# Patient Record
Sex: FEMALE | Race: WHITE | NOT HISPANIC OR LATINO | URBAN - METROPOLITAN AREA
[De-identification: names, ages, dates, MRNs, and addresses within clinical notes are randomized per-mention and may not be internally consistent; named-entity substitution may affect disease eponyms.]

---

## 2021-02-21 PROBLEM — K44.9 HH (HIATUS HERNIA): Status: ACTIVE | Noted: 2017-01-29

## 2021-02-21 PROBLEM — R41.0 DELIRIUM: Status: ACTIVE | Noted: 2021-01-11

## 2021-02-21 PROBLEM — D64.9 ANEMIA: Status: ACTIVE | Noted: 2019-10-29

## 2021-02-21 PROBLEM — U07.1 COVID-19 VIRUS INFECTION: Status: ACTIVE | Noted: 2020-12-18

## 2021-02-21 PROBLEM — I63.9 CEREBELLAR INFARCTION (HCC): Status: ACTIVE | Noted: 2020-12-07

## 2021-02-21 PROBLEM — K59.01 CONSTIPATION BY DELAYED COLONIC TRANSIT: Status: ACTIVE | Noted: 2017-01-29

## 2021-02-21 PROBLEM — F03.90 DEMENTIA WITHOUT BEHAVIORAL DISTURBANCE (HCC): Status: ACTIVE | Noted: 2021-01-11

## 2021-02-21 PROBLEM — C44.42 SQUAMOUS CELL CARCINOMA OF SCALP: Status: ACTIVE | Noted: 2018-10-29

## 2021-02-21 PROBLEM — C44.42 SQUAMOUS CELL CARCINOMA OF SCALP: Status: RESOLVED | Noted: 2018-10-29 | Resolved: 2021-02-21

## 2021-02-21 PROBLEM — M19.90 DJD (DEGENERATIVE JOINT DISEASE): Status: ACTIVE | Noted: 2017-01-29

## 2021-02-21 PROBLEM — N18.30 CKD (CHRONIC KIDNEY DISEASE) STAGE 3, GFR 30-59 ML/MIN (HCC): Status: ACTIVE | Noted: 2018-11-04

## 2021-02-21 PROBLEM — R82.71 ASYMPTOMATIC BACTERIURIA: Status: RESOLVED | Noted: 2020-12-18 | Resolved: 2021-02-21

## 2021-02-21 PROBLEM — I51.89 DIASTOLIC DYSFUNCTION: Status: ACTIVE | Noted: 2019-08-12
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
## 2021-02-21 PROBLEM — U07.1 COVID-19 VIRUS INFECTION: Status: RESOLVED | Noted: 2020-12-18 | Resolved: 2021-02-21

## 2021-02-21 PROBLEM — I63.81 LEFT SIDED LACUNAR INFARCTION (HCC): Status: ACTIVE | Noted: 2017-01-29

## 2021-02-21 PROBLEM — R13.10 DYSPHAGIA: Status: ACTIVE | Noted: 2019-08-12

## 2021-02-21 PROBLEM — R82.71 ASYMPTOMATIC BACTERIURIA: Status: ACTIVE | Noted: 2020-12-18

## 2021-02-21 RX ORDER — CITALOPRAM 10 MG/1
10 TABLET ORAL DAILY
COMMUNITY

## 2021-02-21 RX ORDER — ACETAMINOPHEN 325 MG/1
650 TABLET ORAL EVERY 4 HOURS PRN
COMMUNITY

## 2021-02-21 RX ORDER — OLANZAPINE 5 MG/1
2.5 TABLET, ORALLY DISINTEGRATING ORAL
COMMUNITY
Start: 2021-01-08 | End: 2021-07-30

## 2021-02-21 RX ORDER — BISACODYL 10 MG
10 SUPPOSITORY, RECTAL RECTAL
COMMUNITY

## 2021-02-21 RX ORDER — SIMVASTATIN 40 MG
40 TABLET ORAL
COMMUNITY
End: 2021-12-13

## 2021-02-21 RX ORDER — MAGNESIUM HYDROXIDE 2400 MG/30ML
30 SUSPENSION ORAL DAILY PRN
COMMUNITY

## 2021-02-21 RX ORDER — AMOXICILLIN 250 MG
1 CAPSULE ORAL 2 TIMES DAILY PRN
COMMUNITY
Start: 2021-01-08

## 2021-02-21 RX ORDER — LANOLIN ALCOHOL/MO/W.PET/CERES
3 CREAM (GRAM) TOPICAL
COMMUNITY

## 2021-02-22 ENCOUNTER — NURSING HOME VISIT (OUTPATIENT)
Dept: GERIATRICS | Facility: OTHER | Age: 86
End: 2021-02-22
Payer: COMMERCIAL

## 2021-02-22 DIAGNOSIS — R13.12 OROPHARYNGEAL DYSPHAGIA: Primary | ICD-10-CM

## 2021-02-22 DIAGNOSIS — N18.31 STAGE 3A CHRONIC KIDNEY DISEASE (HCC): ICD-10-CM

## 2021-02-22 DIAGNOSIS — F01.50 VASCULAR DEMENTIA WITHOUT BEHAVIORAL DISTURBANCE (HCC): ICD-10-CM

## 2021-02-22 DIAGNOSIS — E53.8 VITAMIN B 12 DEFICIENCY: ICD-10-CM

## 2021-02-22 DIAGNOSIS — E78.2 MIXED HYPERLIPIDEMIA: ICD-10-CM

## 2021-02-22 DIAGNOSIS — R26.2 AMBULATORY DYSFUNCTION: ICD-10-CM

## 2021-02-22 DIAGNOSIS — I10 ESSENTIAL HYPERTENSION: ICD-10-CM

## 2021-02-22 PROCEDURE — 99305 1ST NF CARE MODERATE MDM 35: CPT | Performed by: FAMILY MEDICINE

## 2021-02-22 NOTE — PROGRESS NOTES
[address]  [address] [place] [address]  [facility]    NAME: Ruth Coleman  AGE: 80 y o  SEX: female    DATE OF ENCOUNTER: 2/22/2021    Code status:  No CPR    Assessment and Plan     Dysphagia  On pureed diet, will continue with monitoring  Wt stable  Dementia without behavioral disturbance ([address] )  Needs NH care, wt stable  Will continue with monitoring  CKD (chronic kidney disease) stage 3, GFR 30-59 ml/min  Last lab done on 1/18/2021  will continue with monitoring  Hyperlipidemia  On statin, will continue with monitoring  Vitamin B 12 deficiency  On B12, will continue with monitoring  Essential hypertension  Off Valsartan since last hospitalization, stable  Last BMP done in January  Ambulatory dysfunction  With generalized weakness, PT on board  Will continue with monitoring  All medications and routine orders were reviewed and updated as needed  Plan discussed with: Nurse    Chief Complaint     Pt has no specific complaint  History of Present Illness   [facility] as this note who was admitted to [facility] on 12/15 with ambulatory dysfunction, and generalized weakness, and malaise and was covid-19 positive, and pt was not a candidate for either Decadron or Remdesivir  Pt also had E coli bacteruria w/o symptoms likely 2nd to colonization  Pt's hospital course was complicated with acute metabolic encephalopathy which was improved  Pt was also delusional at the hospital  Pt also had mild hypotension and her Valsartan was discontinued  Pt also had JOÃO/CKD  Pt was admitted to [facility] on 1/10/2021 under LV provider's care and she is now in C2 unit under my care  Pt's wt has been stable (last wt in January)  Tolerates pureed diet  Last lab done in January       HISTORY:  Past Medical History:   Diagnosis Date    Asymptomatic bacteriuria 12/18/2020    COVID-19 virus infection 12/18/2020    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/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    Formatting of this note might be different from the original  Added automatically from request for surgery [id number]     Family History   Problem Relation Age of Onset    Coronary artery disease Mother     Cancer Father      Social History     Socioeconomic History    Marital status: None     Spouse name: None    Number of children: None    Years of education: None    Highest education level: None   Occupational History    None   Social Needs    Financial resource strain: None    Food insecurity     Worry: None     Inability: None    Transportation needs     Medical: None     Non-medical: None   Tobacco Use    Smoking status: Never Smoker    Smokeless tobacco: Never Used   Substance and Sexual Activity    Alcohol use: Never     Frequency: Never    Drug use: Never    Sexual activity: Not Currently   Lifestyle    Physical activity     Days per week: None     Minutes per session: None    Stress: None   Relationships    Social connections     Talks on phone: None     Gets together: None     Attends Congregation service: None     Active member of club or organization: None     Attends meetings of clubs or organizations: None     Relationship status: None    Intimate partner violence     Fear of current or ex partner: None     Emotionally abused: None     Physically abused: None     Forced sexual activity: None   Other Topics Concern    None   Social History Narrative    None       Allergies: Allergies   Allergen Reactions    Lisinopril Hives     Other reaction(s): LISINOPRIL (ZESTRIL) (hives)       Review of Systems     Review of Systems   Unable to perform ROS: Dementia (but pt overall has no complaint )       Medications and orders     All medications reviewed and updated in Nursing Home EMR  Objective     Vitals: wt in January:128 1Ibs   Afebrile  BP:130/58   RR:18    Physical Exam  Vitals signs and nursing note reviewed     Constitutional:       General: She is not in acute distress  Appearance: Normal appearance  She is well-developed  She is not ill-appearing, toxic-appearing or diaphoretic  HENT:      Head: Normocephalic and atraumatic  Right Ear: External ear normal       Left Ear: External ear normal       Ears:      Comments: [place]     Nose: Nose normal       Mouth/Throat:      Mouth: Mucous membranes are moist    Eyes:      General: No scleral icterus  Right eye: No discharge  Left eye: No discharge  Conjunctiva/sclera: Conjunctivae normal       Pupils: Pupils are equal, round, and reactive to light  Neck:      Musculoskeletal: Normal range of motion and neck supple  No neck rigidity or muscular tenderness  Cardiovascular:      Rate and Rhythm: Normal rate and regular rhythm  Heart sounds: Normal heart sounds  No murmur  No friction rub  No gallop  Pulmonary:      Effort: Pulmonary effort is normal  No respiratory distress  Breath sounds: Normal breath sounds  No stridor  No wheezing, rhonchi or rales  Chest:      Chest wall: No tenderness  Abdominal:      General: Abdomen is flat  Bowel sounds are normal  There is no distension  Palpations: Abdomen is soft  There is no mass  Tenderness: There is no abdominal tenderness  There is no guarding or rebound  Hernia: No hernia is present  Genitourinary:     Comments: Deferred  Musculoskeletal:         General: No swelling, tenderness, deformity or signs of injury  Right lower leg: No edema  Left lower leg: No edema  Comments: Pt is in bed, lying  Limited ROM of UEs and LEs  Lymphadenopathy:      Cervical: No cervical adenopathy  Skin:     General: Skin is warm and dry  Coloration: Skin is not jaundiced or pale  Findings: No bruising, erythema, lesion or rash  Comments: Didn't examine sacral area  Neurological:      Mental Status: She is alert  Cranial Nerves: Cranial nerve deficit ([place]) present        Comments: Limited due to pt's dementia    Psychiatric:         Behavior: Behavior normal          Pertinent Laboratory/Diagnostic Studies: The following labs/studies were reviewed please see chart or hospital paperwork for details    CBC WITH DIFF (01/18/2021 5:10 AM EST)  CBC WITH DIFF (01/18/2021 5:10 AM EST)   Component Value Ref Range Performed At Pathologist Signature   Hemoglobin 9 5 (L) 11 5 - 14 5 g/dL [facility]     Hematocrit 28 5 (L) 35 0 - 43 0 % [facility]     WBC 6 2 4 0 - 10 0 thou/cmm [facility]     RBC 3 14 (L) 3 70 - 4 70 mill/cmm [facility]     Platelet Count 987 920 - 350 thou/cmm [facility]     MPV 9 6 7 5 - 11 3 fL [facility]     MCV 91 80 - 100 fL [facility]     MCH 30 1 26 0 - 34 0 pg [facility]     MCHC 33 2 32 0 - 37 0 g/dL [facility]     RDW 15 8 12 0 - 16 0 % [facility]     Differential Type AUTO   [facility]     Absolute Neutrophils 3 3 1 8 - 7 8 thou/cmm [facility]     Absolute Lymphocytes 2 1 1 0 - 3 0 thou/cmm [facility]     Absolute Monocytes 0 5 0 3 - 1 0 thou/cmm [facility]     Absolute Eosinophils 0 2 0 0 - 0 5 thou/cmm [facility]     Absolute Basophils 0 0 0 0 - 0 1 thou/cmm [facility]     Neutrophils 53 % [facility]     Lymphocytes 34 % [facility]     Monocytes 9 % [facility]     Eosinophils 3 % [facility]     Basophils 1 % [facility]       BASIC METABOLIC PANEL (50/85/0857 5:10 AM EST)  BASIC METABOLIC PANEL (21/64/6952 5:10 AM EST)   Component Value Ref Range Performed At Pathologist Signature   Glucose 76 65 - 99 mg/dL [facility]     BUN 26 (H) 7 - 25 mg/dL [facility]     Creatinine 0 95 0 40 - 1 10 mg/dL [facility]     Sodium 141 135 - 145 mmol/L [facility]     Potassium 4 2 3 5 - 5 2 mmol/L [facility]     Chloride 108 100 - 109 mmol/L [facility]     Carbon Dioxide 27 23 - 31 mmol/L [facility]     Calcium 8 9 8 5 - 10 1 mg/dL [facility]     Anion Gap 6 3 - 11 [facility]     eGFR, Non-African American 52 (L) >60 [facility]     eGFR,  60 (L) >60 [facility]     eGFR Comment Units: mL/min per 1 73 square meters  Comment:                                            Normal Function or Mild Renal   Disease (if clinically at risk):  >or=60  Moderately Decreased:                30-59  Severely Decreased:                  15-29  Renal Failure:                         <15                                           Please note that the eGFR is based on the CKD-EPI calculation, and is not intended to be used for drug dosing                                            Note: Calculated GFR may not be an accurate indicator of renal function if the patient's renal function is not in a steady state            COVID-19 CORONAVIRUS PCRResulted: 12/29/2020 4:02 PM  [facility]  Component Name Value Ref Range   SARS Coronavirus 2 PCR Detected (A)   Comment:                                            Results confidentially reported to health authorities  [facility] requires mandatory reporting of any positive result for this assay                                             Method: Reverse Transcription Polymerase Chain Reaction                                            This test cannot rule out infections caused by other viral or bacterial pathogens  A Not Detected result does not rule out the presence of COVID-19 below the level of detection for this assay or the presence of inhibitors  Results should be interpreted in conjunction with clinical and epidemiological information  Re-testing should be considered in consultation with public health authorities                                              As of March 13 2020, CDC recommends collecting upper respiratory nasopharyngeal swab (NP) for initial diagnostic testing  Specimens should be collected as soon as possible once a PUI is identified, regardless of the time of symptom onset                                             The reagents for this assay have been granted Emergency Use Authorization (EUA) by the U S  Food and Drug Administration  The performance of this assay has been verified by this laboratory, qualified under the Federal Clinical Laboratory Improvement Amendments (CLIA) to perform high complexity clinical laboratory testing                                             In order to maximize COVID19 testing capacity, [facility] is utilizing multiple platforms with comparable performance for SARS CoV-2 detection  Testing will be distributed amongst these assays depending on turnaround time and reagent availability   Not Detected              Scotty Walker MD  2/22/2021 4:48 PM

## 2021-03-10 ENCOUNTER — NURSING HOME VISIT (OUTPATIENT)
Dept: GERIATRICS | Facility: OTHER | Age: 86
End: 2021-03-10
Payer: COMMERCIAL

## 2021-03-10 DIAGNOSIS — R22.31 AXILLARY LUMP, RIGHT: Primary | ICD-10-CM

## 2021-03-10 PROCEDURE — 99308 SBSQ NF CARE LOW MDM 20: CPT | Performed by: FAMILY MEDICINE

## 2021-03-10 NOTE — PROGRESS NOTES
[address] [facility]  [address]  [phone]  Facility: [place] [address]          NAME: Ashish Tomlinson  AGE: 80 y o  SEX: female    DATE OF ENCOUNTER: 3/10/2021    Chief Complaint     Pt has no complaint     History of Present Illness     HPI    The following portions of the patient's history were reviewed and updated as appropriate (from facility chart and hospital records): allergies, current medications, past family history, past medical history, past social history, past surgical history and problem list  Pt was seen and examined per staff report and request regarding incidental finding of a lump under left axillary area  No other issues or concerns  Review of Systems     Review of Systems   Unable to perform ROS: Dementia   Skin:        Pt denies any pain        Active Problem List     Patient Active Problem List   Diagnosis    Anemia    Cerebellar infarction ([facility] )    CKD (chronic kidney disease) stage 3, GFR 30-59 ml/min    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    Delirium    Dementia without behavioral disturbance ([facility] )    Diastolic dysfunction    DJD (degenerative joint disease)    Dysphagia    Essential hypertension    Fall    Glucose intolerance (impaired glucose tolerance)    HH (hiatus hernia)    Hyperlipidemia    Left sided lacunar infarction ([facility] )    Vitamin B 12 deficiency    Ambulatory dysfunction    Axillary lump, right       Objective     Vitals: afebrile     Physical Exam  Skin:     Comments: Left anterior axillary line has movable, 1x1 cm lump  Not tender, not red  Not open  Pertinent Laboratory/Diagnostic Studies:  No lab for this visit     Current Medications   Medication list in facility chart was reviewed and no changes made  Assessment and Plan   Axillary lump, right  ??? Lipoma  Pt is on comfort care, will continue with watchful management       Mert Hall MD  5/92/07060:02 PM

## 2021-03-24 ENCOUNTER — NURSING HOME VISIT (OUTPATIENT)
Dept: GERIATRICS | Facility: OTHER | Age: 86
End: 2021-03-24
Payer: COMMERCIAL

## 2021-03-24 DIAGNOSIS — F22 DELUSIONAL DISORDER (HCC): ICD-10-CM

## 2021-03-24 DIAGNOSIS — R13.12 OROPHARYNGEAL DYSPHAGIA: ICD-10-CM

## 2021-03-24 DIAGNOSIS — E78.2 MIXED HYPERLIPIDEMIA: ICD-10-CM

## 2021-03-24 DIAGNOSIS — E53.8 VITAMIN B 12 DEFICIENCY: ICD-10-CM

## 2021-03-24 DIAGNOSIS — N18.31 STAGE 3A CHRONIC KIDNEY DISEASE (HCC): ICD-10-CM

## 2021-03-24 DIAGNOSIS — F01.50 VASCULAR DEMENTIA WITHOUT BEHAVIORAL DISTURBANCE (HCC): Primary | ICD-10-CM

## 2021-03-24 PROCEDURE — 99309 SBSQ NF CARE MODERATE MDM 30: CPT | Performed by: FAMILY MEDICINE

## 2021-03-24 NOTE — PROGRESS NOTES
[address] [facility]  [address]  [phone]  Facility: [place] [address]          NAME: Alyx Gallegos  AGE: 80 y o  SEX: female    DATE OF ENCOUNTER: 3/24/2021    Chief Complaint     Pt has no specific complaint     History of Present Illness     HPI    The following portions of the patient's history were reviewed and updated as appropriate (from facility chart and hospital records): allergies, current medications, past family history, past medical history, past social history, past surgical history and problem list  Pt was seen and examined for f/u on Dementia, Dysphagia, CKD, HLD,HTN,and ambulatory dysfunction  Pt has been stable  Wt is stable  BP& BS stable  No behaviors  Meal completion good, tolerates pureed diet  Overall stale  No specific complaint or issues  On statin  Review of Systems     Review of Systems   Constitutional: Negative  HENT: Negative  Eyes: Negative  Respiratory: Negative  Cardiovascular: Negative  Gastrointestinal: Negative  Endocrine: Negative  Genitourinary: Negative  Musculoskeletal: Negative  Skin: Negative  Allergic/Immunologic: Negative  Neurological: Negative  Hematological: Negative  Psychiatric/Behavioral: Negative  All other systems reviewed and are negative        Active Problem List     Patient Active Problem List   Diagnosis    Anemia    Cerebellar infarction ([address] )    CKD (chronic kidney disease) stage 3, GFR 30-59 ml/min    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    Delirium    Dementia without behavioral disturbance ([address] )    Diastolic dysfunction    DJD (degenerative joint disease)    Dysphagia    Essential hypertension    Fall    Glucose intolerance (impaired glucose tolerance)    HH (hiatus hernia)    Hyperlipidemia    Left sided lacunar infarction ([address] )    Vitamin B 12 deficiency    Ambulatory dysfunction    Axillary lump, right    Delusional disorder ([address] )       Objective Vitals: wt:137 1Ibs       BP:116/62       Afebrile     Physical Exam  Vitals signs and nursing note reviewed  Constitutional:       General: She is not in acute distress  Appearance: Normal appearance  She is well-developed  She is not ill-appearing, toxic-appearing or diaphoretic  HENT:      Head: Normocephalic and atraumatic  Right Ear: External ear normal       Left Ear: External ear normal       Ears:      Comments: [place]     Nose: Nose normal  No congestion or rhinorrhea  Mouth/Throat:      Mouth: Mucous membranes are moist    Eyes:      General: No scleral icterus  Right eye: No discharge  Left eye: No discharge  Conjunctiva/sclera: Conjunctivae normal       Pupils: Pupils are equal, round, and reactive to light  Neck:      Musculoskeletal: Normal range of motion and neck supple  No neck rigidity or muscular tenderness  Cardiovascular:      Rate and Rhythm: Normal rate and regular rhythm  Heart sounds: Normal heart sounds  No murmur  No friction rub  No gallop  Pulmonary:      Effort: Pulmonary effort is normal  No respiratory distress  Breath sounds: Normal breath sounds  No stridor  No wheezing, rhonchi or rales  Chest:      Chest wall: No tenderness  Abdominal:      General: Bowel sounds are normal  There is no distension  Palpations: Abdomen is soft  There is no mass  Tenderness: There is no abdominal tenderness  There is no guarding or rebound  Hernia: No hernia is present  Musculoskeletal:         General: No swelling, tenderness, deformity or signs of injury  Right lower leg: No edema  Left lower leg: No edema  Comments: In [place], sitting  Moves all extremities, limited ROM  Lymphadenopathy:      Cervical: No cervical adenopathy  Skin:     General: Skin is warm and dry  Coloration: Skin is not jaundiced or pale  Findings: Erythema present  No bruising, lesion or rash     Neurological:      Mental Status: She is alert and oriented to person, place, and time  Cranial Nerves: Cranial nerve deficit ([place]) present  Comments: Forgetful  Follows commands  Pertinent Laboratory/Diagnostic Studies:  1/18: CBC, BMP    Current Medications   Medication list in facility chart was reviewed and no changes made  Assessment and Plan   Dementia without behavioral disturbance ([facility] )  No behaviors  Wt stable  Will continue with monitoring  Dysphagia  Tolerates pureed diet, will continue with monitoring  CKD (chronic kidney disease) stage 3, GFR 30-59 ml/min  Last lab done in January, stable  Hyperlipidemia  On statin, will continue with monitoring  Vitamin B 12 deficiency  On vitamin B12  Will continue with monitoring  Delusional disorder ([facility] )  On Zyprexa, will continue monitoring       Scotty Walker MD  4/60/91500:77 PM

## 2021-04-26 ENCOUNTER — NURSING HOME VISIT (OUTPATIENT)
Dept: GERIATRICS | Facility: OTHER | Age: 86
End: 2021-04-26
Payer: COMMERCIAL

## 2021-04-26 DIAGNOSIS — N18.31 STAGE 3A CHRONIC KIDNEY DISEASE (HCC): ICD-10-CM

## 2021-04-26 DIAGNOSIS — F01.50 VASCULAR DEMENTIA WITHOUT BEHAVIORAL DISTURBANCE (HCC): ICD-10-CM

## 2021-04-26 DIAGNOSIS — F22 DELUSIONAL DISORDER (HCC): ICD-10-CM

## 2021-04-26 DIAGNOSIS — R13.12 OROPHARYNGEAL DYSPHAGIA: Primary | ICD-10-CM

## 2021-04-26 PROCEDURE — 99309 SBSQ NF CARE MODERATE MDM 30: CPT | Performed by: FAMILY MEDICINE

## 2021-04-26 NOTE — PROGRESS NOTES
[address] [facility]  [address]  [phone]  Facility: [place] [address]          NAME: Kenisha Burns  AGE: 80 y o  SEX: female    DATE OF ENCOUNTER: 4/26/2021    Chief Complaint     Pt has no specific complaint     History of Present Illness     HPI    The following portions of the patient's history were reviewed and updated as appropriate (from facility chart and hospital records): allergies, current medications, past family history, past medical history, past social history, past surgical history and problem list   Pt was seen and examined for f/u on HLD, CKD, Dysphagia, Dementia, and Delusional disorder  Pt continues with NH care  Wt stable  Last lab done in  January  No behaviors  Tolerates pureed diet  Last lab done in January  Stable with two dose Zyprexa  Review of Systems     Review of Systems   Constitutional: Negative  HENT: Negative  Eyes: Negative  Respiratory: Negative  Cardiovascular: Negative  Gastrointestinal: Negative  Endocrine: Negative  Genitourinary: Negative  Musculoskeletal: Negative  Skin: Negative  Allergic/Immunologic: Negative  Neurological: Negative  Hematological: Negative  Psychiatric/Behavioral: Negative  All other systems reviewed and are negative        Active Problem List     Patient Active Problem List   Diagnosis    Anemia    Cerebellar infarction ([address] )    CKD (chronic kidney disease) stage 3, GFR 30-59 ml/min ([facility]    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    Delirium    Dementia without behavioral disturbance ([address] )    Diastolic dysfunction    DJD (degenerative joint disease)    Dysphagia    Essential hypertension    Fall    Glucose intolerance (impaired glucose tolerance)    HH (hiatus hernia)    Hyperlipidemia    Left sided lacunar infarction ([address] )    Vitamin B 12 deficiency    Ambulatory dysfunction    Axillary lump, right    Delusional disorder ([address] )       Objective     Vitals: wt: 136 8Ibs      BP:118/61    Afebrile          CT:78    Physical Exam  Vitals signs and nursing note reviewed  Constitutional:       General: She is not in acute distress  Appearance: Normal appearance  She is well-developed  She is not ill-appearing, toxic-appearing or diaphoretic  HENT:      Head: Normocephalic and atraumatic  Right Ear: External ear normal       Left Ear: External ear normal       Ears:      Comments: [place]     Nose: Nose normal  No congestion or rhinorrhea  Mouth/Throat:      Mouth: Mucous membranes are moist    Eyes:      General: No scleral icterus  Right eye: No discharge  Left eye: No discharge  Conjunctiva/sclera: Conjunctivae normal       Pupils: Pupils are equal, round, and reactive to light  Neck:      Musculoskeletal: Normal range of motion and neck supple  No neck rigidity or muscular tenderness  Cardiovascular:      Rate and Rhythm: Normal rate and regular rhythm  Heart sounds: Normal heart sounds  No murmur  No friction rub  No gallop  Pulmonary:      Effort: Pulmonary effort is normal  No respiratory distress  Breath sounds: Normal breath sounds  No stridor  No wheezing, rhonchi or rales  Chest:      Chest wall: No tenderness  Abdominal:      General: Bowel sounds are normal  There is no distension  Palpations: Abdomen is soft  There is no mass  Tenderness: There is no abdominal tenderness  There is no guarding or rebound  Hernia: No hernia is present  Genitourinary:     Comments: Deferred  Musculoskeletal:         General: No swelling, tenderness, deformity or signs of injury  Right lower leg: Edema (trace ) present  Left lower leg: Edema (trace ) present  Comments: In WC, sitting  Limited ROm  Lymphadenopathy:      Cervical: No cervical adenopathy  Skin:     General: Skin is warm and dry  Coloration: Skin is not jaundiced or pale        Findings: No bruising, erythema, lesion or rash       Comments: Didn't examine sacral area  Neurological:      Mental Status: She is alert and oriented to person, place, and time  Cranial Nerves: Cranial nerve deficit ([place]) present  Comments: Follows commands  Psychiatric:         Behavior: Behavior normal          Pertinent Laboratory/Diagnostic Studies:  [place]:CBC, CMP    Current Medications   Medication list in facility chart was reviewed and no changes made  Assessment and Plan   Dysphagia  Tolerates pureed diet  Wt stable  Will continue with monitoring  Dementia without behavioral disturbance ([facility])  Wt stable  Continues with NH care  CKD (chronic kidney disease) stage 3, GFR 30-59 ml/min ([facility])  Last lab done in January, will continue with monitoring  Delusional disorder ([facility] )  Stable with low dose of Zyprexa       Silvia Khalil MD  6/73/82514:49 PM

## 2021-05-26 ENCOUNTER — NURSING HOME VISIT (OUTPATIENT)
Dept: GERIATRICS | Facility: OTHER | Age: 86
End: 2021-05-26
Payer: COMMERCIAL

## 2021-05-26 DIAGNOSIS — I10 ESSENTIAL HYPERTENSION: ICD-10-CM

## 2021-05-26 DIAGNOSIS — F01.50 VASCULAR DEMENTIA WITHOUT BEHAVIORAL DISTURBANCE (HCC): Primary | ICD-10-CM

## 2021-05-26 DIAGNOSIS — D50.9 IRON DEFICIENCY ANEMIA, UNSPECIFIED IRON DEFICIENCY ANEMIA TYPE: ICD-10-CM

## 2021-05-26 DIAGNOSIS — R13.12 OROPHARYNGEAL DYSPHAGIA: ICD-10-CM

## 2021-05-26 DIAGNOSIS — N18.31 STAGE 3A CHRONIC KIDNEY DISEASE (HCC): ICD-10-CM

## 2021-05-26 PROCEDURE — 99309 SBSQ NF CARE MODERATE MDM 30: CPT | Performed by: FAMILY MEDICINE

## 2021-05-26 RX ORDER — FERROUS SULFATE 325(65) MG
325 TABLET ORAL
COMMUNITY

## 2021-05-26 NOTE — PROGRESS NOTES
[facility]  [address]  [phone]  [facility]        NAME: Mariam Scherer  AGE: 80 y o  SEX: female    DATE OF ENCOUNTER: 5/26/2021    Chief Complaint     Pt has no specific complaint     History of Present Illness     HPI    The following portions of the patient's history were reviewed and updated as appropriate (from facility chart and hospital records): allergies, current medications, past family history, past medical history, past social history, past surgical history and problem list  Pt was seen and examined for f/u on Anemia, CKd, Dementia, Dysphagia, and Anemia  Pt's labs for today reviewed, low Iron level  H&H stable  Continues with NH care  No specific compliant  WC bound  Renal function stable on todays labs  No behaviors  Tolerates pureed diet  Pt has been gaining wt  Review of Systems     Review of Systems   Constitutional: Negative  HENT: Negative  Eyes: Negative  Respiratory: Negative  Cardiovascular: Negative  Gastrointestinal: Negative  Endocrine: Negative  Genitourinary: Negative  Musculoskeletal: Negative  Skin: Negative  Allergic/Immunologic: Negative  Neurological: Negative  Hematological: Negative  Psychiatric/Behavioral: Negative  All other systems reviewed and are negative        Active Problem List     Patient Active Problem List   Diagnosis    Anemia    Cerebellar infarction ([address] )    CKD (chronic kidney disease) stage 3, GFR 30-59 ml/min ([facility])    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    Delirium    Dementia without behavioral disturbance ([address] )    Diastolic dysfunction    DJD (degenerative joint disease)    Dysphagia    Essential hypertension    Fall    Glucose intolerance (impaired glucose tolerance)    HH (hiatus hernia)    Hyperlipidemia    Left sided lacunar infarction ([address] )    Vitamin B 12 deficiency    Ambulatory dysfunction    Axillary lump, right    Delusional disorder ([address] )       Objective     Vitals: wt:140 3Ibs       BP:126/68    Afebrile     Physical Exam  Vitals signs and nursing note reviewed  Constitutional:       General: She is not in acute distress  Appearance: Normal appearance  She is well-developed  She is not ill-appearing, toxic-appearing or diaphoretic  HENT:      Head: Normocephalic and atraumatic  Right Ear: External ear normal       Left Ear: External ear normal       Ears:      Comments: [place]     Nose: Nose normal  No congestion or rhinorrhea  Mouth/Throat:      Comments: Missing teeth   Eyes:      General: No scleral icterus  Right eye: No discharge  Left eye: No discharge  Conjunctiva/sclera: Conjunctivae normal       Pupils: Pupils are equal, round, and reactive to light  Neck:      Musculoskeletal: Normal range of motion and neck supple  No neck rigidity or muscular tenderness  Cardiovascular:      Rate and Rhythm: Normal rate and regular rhythm  Heart sounds: Normal heart sounds  No murmur  No friction rub  No gallop  Pulmonary:      Effort: Pulmonary effort is normal  No respiratory distress  Breath sounds: Normal breath sounds  No stridor  No wheezing, rhonchi or rales  Chest:      Chest wall: No tenderness  Abdominal:      General: Abdomen is flat  Bowel sounds are normal  There is no distension  Palpations: Abdomen is soft  There is no mass  Tenderness: There is no abdominal tenderness  There is no guarding or rebound  Hernia: No hernia is present  Genitourinary:     Comments: Deferred  Musculoskeletal:         General: No swelling, tenderness, deformity or signs of injury  Right lower leg: Edema (trace ) present  Left lower leg: Edema (Trace ) present  Comments: In WC, sitting , moves all extremities  Limited ROM  Lymphadenopathy:      Cervical: No cervical adenopathy  Skin:     General: Skin is warm and dry        Coloration: Skin is not jaundiced or pale  Findings: No bruising, erythema, lesion or rash  Comments: Didn't examine sacral area  Neurological:      Mental Status: She is alert  Cranial Nerves: Cranial nerve deficit ([place]) present  Comments: Forgetful follows commands  Psychiatric:         Behavior: Behavior normal          Pertinent Laboratory/Diagnostic Studies:  CBC, BMP, Iron studies today,     Current Medications   Medication list in facility chart was reviewed and necessary changes made  Assessment and Plan   Dementia without behavioral disturbance (HCC)  Gaining wt, stable, no behaviors  Continues with NH care  Essential hypertension  No meds, BP has been stable  Will continue with monitoring  Dysphagia  Tolerates pureed diet  Gaining wt  No issues  CKD (chronic kidney disease) stage 3, GFR 30-59 ml/min ([facility])  Labs stable today  Will continue with monitoring  Anemia  Added Iron, will continue with mointoring, H& H stable         Marquis Kaitlynn MD  6/85/11466:25 PM

## 2021-06-05 ENCOUNTER — TELEPHONE (OUTPATIENT)
Dept: OTHER | Facility: OTHER | Age: 86
End: 2021-06-05

## 2021-06-05 NOTE — TELEPHONE ENCOUNTER
Jairo  for [employer] called with concerns of loose stools and vomiting  Requested call back from on call physician  TT sent to on call provider

## 2021-06-30 ENCOUNTER — NURSING HOME VISIT (OUTPATIENT)
Dept: GERIATRICS | Facility: OTHER | Age: 86
End: 2021-06-30
Payer: COMMERCIAL

## 2021-06-30 DIAGNOSIS — F22 DELUSIONAL DISORDER (HCC): ICD-10-CM

## 2021-06-30 DIAGNOSIS — F01.50 VASCULAR DEMENTIA WITHOUT BEHAVIORAL DISTURBANCE (HCC): Primary | ICD-10-CM

## 2021-06-30 DIAGNOSIS — R13.12 OROPHARYNGEAL DYSPHAGIA: ICD-10-CM

## 2021-06-30 DIAGNOSIS — D50.9 IRON DEFICIENCY ANEMIA, UNSPECIFIED IRON DEFICIENCY ANEMIA TYPE: ICD-10-CM

## 2021-06-30 DIAGNOSIS — N18.31 STAGE 3A CHRONIC KIDNEY DISEASE (HCC): ICD-10-CM

## 2021-06-30 PROCEDURE — 99309 SBSQ NF CARE MODERATE MDM 30: CPT | Performed by: FAMILY MEDICINE

## 2021-06-30 NOTE — PROGRESS NOTES
[facility]  [address]  [phone]  Facility: [place] [address]        NAME: Daylin Glasgow  AGE: 80 y o  SEX: female    DATE OF ENCOUNTER: 6/30/2021    Chief Complaint     Pt denies any pain     History of Present Illness     HPI    The following portions of the patient's history were reviewed and updated as appropriate (from facility chart and hospital records): allergies, current medications, past family history, past medical history, past social history, past surgical history and problem list   Pt was seen and examined for f/u on HTN, Dementia, CKD, Delusional disorder, and anemia  Labs on 6/7 stable  Tolerating pureed diet and has gained wt but wt this month has not changed  BP stable  No issues using Iron  Continues with NH care  WC bound  No behaviros  Pt has been weaned off Zyprexa and stable  Review of Systems     Review of Systems   Unable to perform ROS: Dementia       Active Problem List     Patient Active Problem List   Diagnosis    Anemia    Cerebellar infarction ([address] )    CKD (chronic kidney disease) stage 3, GFR 30-59 ml/min ([facility]    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    Delirium    Dementia without behavioral disturbance ([address] )    Diastolic dysfunction    DJD (degenerative joint disease)    Dysphagia    Essential hypertension    Fall    Glucose intolerance (impaired glucose tolerance)    HH (hiatus hernia)    Hyperlipidemia    Left sided lacunar infarction ([address] )    Vitamin B 12 deficiency    Ambulatory dysfunction    Axillary lump, right    Delusional disorder ([address] )       Objective     Vitals: wt:140 6Ibs      Physical Exam  Vitals and nursing note reviewed  Constitutional:       General: She is not in acute distress  Appearance: Normal appearance  She is well-developed  She is not ill-appearing, toxic-appearing or diaphoretic  HENT:      Head: Normocephalic and atraumatic        Right Ear: External ear normal       Left Ear: External ear normal       Ears:      Comments: [place]     Nose: Nose normal  No congestion or rhinorrhea  Mouth/Throat:      Comments: Missing teeth   Eyes:      General: No scleral icterus  Right eye: No discharge  Left eye: No discharge  Conjunctiva/sclera: Conjunctivae normal       Pupils: Pupils are equal, round, and reactive to light  Cardiovascular:      Rate and Rhythm: Normal rate and regular rhythm  Heart sounds: Normal heart sounds  No murmur heard  No friction rub  No gallop  Pulmonary:      Effort: Pulmonary effort is normal  No respiratory distress  Breath sounds: Normal breath sounds  No stridor  No wheezing, rhonchi or rales  Chest:      Chest wall: No tenderness  Abdominal:      General: Bowel sounds are normal  There is no distension  Palpations: Abdomen is soft  There is no mass  Tenderness: There is no abdominal tenderness  There is no guarding or rebound  Hernia: No hernia is present  Genitourinary:     Comments: Deferred  Musculoskeletal:         General: No swelling, tenderness, deformity or signs of injury  Cervical back: Normal range of motion and neck supple  No rigidity or tenderness  Right lower leg: Edema present  Left lower leg: Edema present  Comments: In WC, sitting  Moves all extremities with limited ROM  Lymphadenopathy:      Cervical: No cervical adenopathy  Skin:     General: Skin is warm and dry  Coloration: Skin is not jaundiced or pale  Findings: Erythema present  No bruising, lesion or rash  Comments: Didn't examine sacral area  Neurological:      Mental Status: She is alert  Cranial Nerves: Cranial nerve deficit ([place]) present  Comments: Not oriented, forgetful  Follows commands      Psychiatric:         Behavior: Behavior normal          Pertinent Laboratory/Diagnostic Studies:  6/7: CBC, CMP     Current Medications   Medication list in facility chart was reviewed and no changes made  Assessment and Plan     Dementia without behavioral disturbance ([facility] )  Stable with nursing home care  Gaining wt  CKD (chronic kidney disease) stage 3, GFR 30-59 ml/min ([facility])  Last lab this month and stable  Anemia  On added Iron, last lab done this month  Dysphagia  Gaining wt ,tolerates pureed diet  Delusional disorder ([facility] )  Off Zyprexa and stable           Malissa Hare MD  4/89/53020:67 PM

## 2021-07-30 ENCOUNTER — NURSING HOME VISIT (OUTPATIENT)
Dept: GERIATRICS | Facility: OTHER | Age: 86
End: 2021-07-30
Payer: COMMERCIAL

## 2021-07-30 DIAGNOSIS — R13.12 OROPHARYNGEAL DYSPHAGIA: Primary | ICD-10-CM

## 2021-07-30 DIAGNOSIS — N18.31 STAGE 3A CHRONIC KIDNEY DISEASE (HCC): ICD-10-CM

## 2021-07-30 DIAGNOSIS — F01.50 VASCULAR DEMENTIA WITHOUT BEHAVIORAL DISTURBANCE (HCC): ICD-10-CM

## 2021-07-30 DIAGNOSIS — D50.9 IRON DEFICIENCY ANEMIA, UNSPECIFIED IRON DEFICIENCY ANEMIA TYPE: ICD-10-CM

## 2021-07-30 PROCEDURE — 99309 SBSQ NF CARE MODERATE MDM 30: CPT | Performed by: FAMILY MEDICINE

## 2021-07-30 NOTE — PROGRESS NOTES
[facility]  [address]  [phone]  Facility: [place] [address]          NAME: Otto Carter  AGE: 80 y o  SEX: female    DATE OF ENCOUNTER: 7/30/2021    Chief Complaint     Pt has no specific complaint     History of Present Illness     HPI    The following portions of the patient's history were reviewed and updated as appropriate (from facility chart and hospital records): allergies, current medications, past family history, past medical history, past social history, past surgical history and problem list   Pt was seen and examined for f/u on dementia, CKd, anemia, and dysphagia  Pt has been stable  Wt stable  Last lab in June stable  Tolerating pureed diet  No behaviors  WC bound  Continues with NH care  Review of Systems     Review of Systems   Unable to perform ROS: Dementia   Musculoskeletal:        Pt denies having any pain        Active Problem List     Patient Active Problem List   Diagnosis    Anemia    Cerebellar infarction ([address] )    CKD (chronic kidney disease) stage 3, GFR 30-59 ml/min ([facility])    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    Delirium    Dementia without behavioral disturbance ([address] )    Diastolic dysfunction    DJD (degenerative joint disease)    Dysphagia    Essential hypertension    Fall    Glucose intolerance (impaired glucose tolerance)    HH (hiatus hernia)    Hyperlipidemia    Left sided lacunar infarction ([address] )    Vitamin B 12 deficiency    Ambulatory dysfunction    Axillary lump, right    Delusional disorder ([address] )       Objective     Vitals: wt:139 7Ibs         BP:125/60        KS:68  RR:18     Physical Exam  Vitals and nursing note reviewed  Constitutional:       General: She is not in acute distress  Appearance: Normal appearance  She is well-developed  She is not ill-appearing, toxic-appearing or diaphoretic  HENT:      Head: Normocephalic and atraumatic        Right Ear: External ear normal       Left Ear: External ear normal       Nose: Nose normal  No congestion or rhinorrhea  Mouth/Throat:      Comments: Dentures in   Eyes:      General: No scleral icterus  Right eye: No discharge  Left eye: No discharge  Extraocular Movements: Extraocular movements intact  Conjunctiva/sclera: Conjunctivae normal       Pupils: Pupils are equal, round, and reactive to light  Cardiovascular:      Rate and Rhythm: Normal rate and regular rhythm  Heart sounds: Normal heart sounds  No murmur heard  No friction rub  No gallop  Pulmonary:      Effort: Pulmonary effort is normal  No respiratory distress  Breath sounds: Normal breath sounds  No stridor  No wheezing, rhonchi or rales  Chest:      Chest wall: No tenderness  Abdominal:      General: Bowel sounds are normal  There is no distension  Palpations: Abdomen is soft  There is no mass  Tenderness: There is no abdominal tenderness  There is no guarding or rebound  Hernia: No hernia is present  Genitourinary:     Comments: Deferred  Musculoskeletal:         General: No swelling, tenderness, deformity or signs of injury  Normal range of motion  Cervical back: Normal range of motion and neck supple  No rigidity or tenderness  Right lower leg: No edema  Left lower leg: No edema  Lymphadenopathy:      Cervical: No cervical adenopathy  Skin:     General: Skin is warm and dry  Coloration: Skin is not jaundiced or pale  Findings: No bruising, erythema, lesion or rash  Comments: Didn't examine sacral area  Neurological:      Mental Status: She is alert  Cranial Nerves: Cranial nerve deficit ([place]) present  Comments: Not oriented  Follows simple commands      Psychiatric:         Mood and Affect: Mood normal          Behavior: Behavior normal          Pertinent Laboratory/Diagnostic Studies:  6/7:CBC, CMP    Current Medications   Medication list in facility chart was reviewed and no changes made        Assessment and Plan     Dysphagia  Tolerating pureed diet  Wt stable  Dementia without behavioral disturbance (HCC)  Wt stable  Continues with NH care  CKD (chronic kidney disease) stage 3, GFR 30-59 ml/min (HCC)  Labs in June stable  Will continue with monitoring and avoiding nephrotoxic agents  Anemia  On Iron, H&H stable in June       Mikey Salamanca MD  0/07/00354:01 PM

## 2021-08-27 ENCOUNTER — NURSING HOME VISIT (OUTPATIENT)
Dept: GERIATRICS | Facility: OTHER | Age: 86
End: 2021-08-27
Payer: COMMERCIAL

## 2021-08-27 DIAGNOSIS — R26.2 AMBULATORY DYSFUNCTION: ICD-10-CM

## 2021-08-27 DIAGNOSIS — F01.50 VASCULAR DEMENTIA WITHOUT BEHAVIORAL DISTURBANCE (HCC): Primary | ICD-10-CM

## 2021-08-27 DIAGNOSIS — N18.31 STAGE 3A CHRONIC KIDNEY DISEASE (HCC): ICD-10-CM

## 2021-08-27 DIAGNOSIS — R13.12 OROPHARYNGEAL DYSPHAGIA: ICD-10-CM

## 2021-08-27 DIAGNOSIS — D50.9 IRON DEFICIENCY ANEMIA, UNSPECIFIED IRON DEFICIENCY ANEMIA TYPE: ICD-10-CM

## 2021-08-27 PROCEDURE — 99309 SBSQ NF CARE MODERATE MDM 30: CPT | Performed by: FAMILY MEDICINE

## 2021-08-28 NOTE — PROGRESS NOTES
[facility]  [address]  [phone]  Facility: [place] [address]          NAME: Hazel Montgomery  AGE: 80 y o  SEX: female    DATE OF ENCOUNTER: 8/27/2021    Chief Complaint     Pt has no specific complaint     History of Present Illness     HPI    The following portions of the patient's history were reviewed and updated as appropriate (from facility chart and hospital records): allergies, current medications, past family history, past medical history, past social history, past surgical history and problem list  Pt was seen and examined for f/u on Dysphagia, CKD, Anemia, and Dementia  Pt continues with NH care  Last lab in June stable  Therapy is working with pt, per therapist pt has increased anxiety with standing up and has pain on knees  Pt reports "A little" pian mostly when she stands up  Her wt is stable with some wt gain  Tolerating pureed diet  H&H stable in June  Overall stable  Review of Systems     Review of Systems   Constitutional: Negative  HENT: Negative  Eyes: Negative  Respiratory: Negative  Cardiovascular: Negative  Gastrointestinal: Negative  Endocrine: Negative  Genitourinary: Negative  Musculoskeletal: Negative  Slight Left knee pain when standing up    Skin: Negative  Allergic/Immunologic: Negative  Neurological: Negative  Hematological: Negative  Psychiatric/Behavioral: Negative  All other systems reviewed and are negative        Active Problem List     Patient Active Problem List   Diagnosis    Anemia    Cerebellar infarction ([address] )    CKD (chronic kidney disease) stage 3, GFR 30-59 ml/min ([facility])    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    Delirium    Dementia without behavioral disturbance ([address] )    Diastolic dysfunction    DJD (degenerative joint disease)    Dysphagia    Essential hypertension    Fall    Glucose intolerance (impaired glucose tolerance)    HH (hiatus hernia)    Hyperlipidemia    Left sided lacunar infarction ([facility] )    Vitamin B 12 deficiency    Ambulatory dysfunction    Axillary lump, right    Delusional disorder (HCC)       Objective     Vitals: wt:139 9Ibs      BP log reviewed afebrile     Physical Exam  Vitals and nursing note reviewed  Constitutional:       General: She is not in acute distress  Appearance: Normal appearance  She is well-developed  She is not ill-appearing, toxic-appearing or diaphoretic  HENT:      Head: Normocephalic and atraumatic  Right Ear: External ear normal       Left Ear: External ear normal       Nose: Nose normal  No congestion or rhinorrhea  Mouth/Throat:      Pharynx: No oropharyngeal exudate  Eyes:      General: No scleral icterus  Right eye: No discharge  Left eye: No discharge  Conjunctiva/sclera: Conjunctivae normal       Pupils: Pupils are equal, round, and reactive to light  Cardiovascular:      Rate and Rhythm: Normal rate and regular rhythm  Heart sounds: Normal heart sounds  No murmur heard  No friction rub  No gallop  Pulmonary:      Effort: Pulmonary effort is normal  No respiratory distress  Breath sounds: Normal breath sounds  No stridor  No wheezing, rhonchi or rales  Chest:      Chest wall: No tenderness  Abdominal:      General: Bowel sounds are normal  There is no distension  Palpations: Abdomen is soft  There is no mass  Tenderness: There is no abdominal tenderness  There is no guarding or rebound  Hernia: No hernia is present  Genitourinary:     Comments: Deferred  Musculoskeletal:         General: No swelling, tenderness, deformity or signs of injury  Cervical back: Normal range of motion and neck supple  No rigidity or tenderness  Right lower leg: No edema  Left lower leg: No edema  Comments: Moves UEs, LEs  Limited ROM, stood up by the wall using bar, unsteady  Lymphadenopathy:      Cervical: No cervical adenopathy     Skin: General: Skin is warm and dry  Coloration: Skin is not jaundiced or pale  Findings: Erythema present  No bruising, lesion or rash  Comments: Didn't examine sacral area  Neurological:      Mental Status: She is alert  Cranial Nerves: Cranial nerve deficit present  Psychiatric:         Behavior: Behavior normal          Pertinent Laboratory/Diagnostic Studies:  June CBC, CMP     Current Medications   Medication list in facility chart was reviewed and necessary changes made  Assessment and Plan   Dementia without behavioral disturbance ([facility] )  Wt is stable  No behaviors  Overall stable  Continues with NH care  Dysphagia  Tolerating pureed diet  Stable  CKD (chronic kidney disease) stage 3, GFR 30-59 ml/min ([facility])  Last lab in June stable  Will continue with monitoring  Next lab on 9/8  Ambulatory dysfunction  Therapy on board  Needs NH care  Anemia  On Iron, next labs on 9/8       Yuval No MD  8/90/41462:86 PM

## 2021-09-27 ENCOUNTER — NURSING HOME VISIT (OUTPATIENT)
Dept: GERIATRICS | Facility: OTHER | Age: 86
End: 2021-09-27
Payer: COMMERCIAL

## 2021-09-27 DIAGNOSIS — D50.9 IRON DEFICIENCY ANEMIA, UNSPECIFIED IRON DEFICIENCY ANEMIA TYPE: ICD-10-CM

## 2021-09-27 DIAGNOSIS — N18.31 STAGE 3A CHRONIC KIDNEY DISEASE (HCC): ICD-10-CM

## 2021-09-27 DIAGNOSIS — R13.12 OROPHARYNGEAL DYSPHAGIA: Primary | ICD-10-CM

## 2021-09-27 DIAGNOSIS — F01.50 VASCULAR DEMENTIA WITHOUT BEHAVIORAL DISTURBANCE (HCC): ICD-10-CM

## 2021-09-27 PROCEDURE — 99309 SBSQ NF CARE MODERATE MDM 30: CPT | Performed by: FAMILY MEDICINE

## 2021-09-27 NOTE — PROGRESS NOTES
[facility]  [address]  [phone]  Facility: [place] [address]          NAME: Josselin Mcneal  AGE: 80 y o  SEX: female    DATE OF ENCOUNTER: 9/27/2021    Chief Complaint     Pt is not providing any info    History of Present Illness     HPI    The following portions of the patient's history were reviewed and updated as appropriate (from facility chart and hospital records): allergies, current medications, past family history, past medical history, past social history, past surgical history and problem list  Pt was seen and examined for f/u on Dysphagia, Dementia, CKD, Anemia, Ambulatory dysfunction  Pt continues with NH care  Tolerating pureed diet  Last lab done on 9/8 and stable  Wt has been stable  Pt continues with NH care  Moves using her WC  No behaviors  Overall stable  Review of Systems     Review of Systems   Unable to perform ROS: Dementia       Active Problem List     Patient Active Problem List   Diagnosis    Anemia    Cerebellar infarction ([address] )    CKD (chronic kidney disease) stage 3, GFR 30-59 ml/min ([facility])    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    Delirium    Dementia without behavioral disturbance ([address] )    Diastolic dysfunction    DJD (degenerative joint disease)    Dysphagia    Essential hypertension    Fall    Glucose intolerance (impaired glucose tolerance)    HH (hiatus hernia)    Hyperlipidemia    Left sided lacunar infarction ([address] )    Vitamin B 12 deficiency    Ambulatory dysfunction    Axillary lump, right    Delusional disorder ([address] )       Objective     Vitals: wt:140 2Ibs     BP:128/60  Asymptomatic     Physical Exam  Vitals and nursing note reviewed  Constitutional:       General: She is not in acute distress  Appearance: Normal appearance  She is well-developed  She is not ill-appearing, toxic-appearing or diaphoretic  HENT:      Head: Normocephalic and atraumatic        Right Ear: External ear normal       Left Ear: External ear normal       Ears:      Comments: [place]     Nose: Nose normal  No congestion or rhinorrhea  Mouth/Throat:      Comments: Missing teeth   Eyes:      General: No scleral icterus  Right eye: No discharge  Left eye: No discharge  Extraocular Movements: Extraocular movements intact  Conjunctiva/sclera: Conjunctivae normal       Pupils: Pupils are equal, round, and reactive to light  Cardiovascular:      Rate and Rhythm: Normal rate and regular rhythm  Heart sounds: Normal heart sounds  No murmur heard  No friction rub  No gallop  Pulmonary:      Effort: Pulmonary effort is normal  No respiratory distress  Breath sounds: Normal breath sounds  No stridor  No wheezing, rhonchi or rales  Chest:      Chest wall: No tenderness  Abdominal:      General: Bowel sounds are normal  There is no distension  Palpations: Abdomen is soft  There is no mass  Tenderness: There is no abdominal tenderness  There is no guarding or rebound  Hernia: No hernia is present  Genitourinary:     Comments: Deferred  Musculoskeletal:         General: No swelling, tenderness, deformity or signs of injury  Cervical back: Normal range of motion and neck supple  No rigidity or tenderness  Right lower leg: No edema  Left lower leg: No edema  Comments: Sitting in WC, limited ROM   Lymphadenopathy:      Cervical: No cervical adenopathy  Skin:     General: Skin is warm and dry  Coloration: Skin is not jaundiced or pale  Findings: No bruising, erythema, lesion or rash  Comments: Didn't examine sacral area  Neurological:      Mental Status: She is alert  Cranial Nerves: Cranial nerve deficit present  Comments: Not oriented, follows simple commands      Psychiatric:         Behavior: Behavior normal          Pertinent Laboratory/Diagnostic Studies:  9/8: BMP, Iron studies,  6/7: CBC     Current Medications   Medication list in facility chart was reviewed and no changes made  Assessment and Plan   Dysphagia  Tolerating pureed diet  Stable  Wt stable  Dementia without behavioral disturbance ([facility] )  Continues with NH care  Stable wt  CKD (chronic kidney disease) stage 3, GFR 30-59 ml/min ([facility])  Last lab on 9/8 stable  Will continue with monitoring  Anemia  On Iron and B12, last Iron studies on 9/8 and last CBC done in June  Will continue with monitoring         Clay Cordoba MD  8/12/28063:89 PM

## 2021-10-28 ENCOUNTER — NURSING HOME VISIT (OUTPATIENT)
Dept: GERIATRICS | Facility: OTHER | Age: 86
End: 2021-10-28
Payer: COMMERCIAL

## 2021-10-28 DIAGNOSIS — F01.50 VASCULAR DEMENTIA WITHOUT BEHAVIORAL DISTURBANCE (HCC): Primary | ICD-10-CM

## 2021-10-28 DIAGNOSIS — D50.9 IRON DEFICIENCY ANEMIA, UNSPECIFIED IRON DEFICIENCY ANEMIA TYPE: ICD-10-CM

## 2021-10-28 DIAGNOSIS — R13.12 OROPHARYNGEAL DYSPHAGIA: ICD-10-CM

## 2021-10-28 DIAGNOSIS — N18.31 STAGE 3A CHRONIC KIDNEY DISEASE (HCC): ICD-10-CM

## 2021-10-28 DIAGNOSIS — F41.9 ANXIETY: ICD-10-CM

## 2021-10-28 PROCEDURE — 99309 SBSQ NF CARE MODERATE MDM 30: CPT | Performed by: FAMILY MEDICINE

## 2021-12-06 ENCOUNTER — NURSING HOME VISIT (OUTPATIENT)
Dept: GERIATRICS | Facility: OTHER | Age: 86
End: 2021-12-06
Payer: COMMERCIAL

## 2021-12-06 DIAGNOSIS — R13.12 OROPHARYNGEAL DYSPHAGIA: Primary | ICD-10-CM

## 2021-12-06 DIAGNOSIS — N18.31 STAGE 3A CHRONIC KIDNEY DISEASE (HCC): ICD-10-CM

## 2021-12-06 DIAGNOSIS — F41.9 ANXIETY: ICD-10-CM

## 2021-12-06 DIAGNOSIS — D50.9 IRON DEFICIENCY ANEMIA, UNSPECIFIED IRON DEFICIENCY ANEMIA TYPE: ICD-10-CM

## 2021-12-06 DIAGNOSIS — F01.50 VASCULAR DEMENTIA WITHOUT BEHAVIORAL DISTURBANCE (HCC): ICD-10-CM

## 2021-12-06 PROCEDURE — 99309 SBSQ NF CARE MODERATE MDM 30: CPT | Performed by: FAMILY MEDICINE

## 2021-12-13 ENCOUNTER — NURSING HOME VISIT (OUTPATIENT)
Dept: GERIATRICS | Facility: OTHER | Age: 86
End: 2021-12-13
Payer: COMMERCIAL

## 2021-12-13 DIAGNOSIS — E78.2 MIXED HYPERLIPIDEMIA: ICD-10-CM

## 2021-12-13 DIAGNOSIS — R13.12 OROPHARYNGEAL DYSPHAGIA: Primary | ICD-10-CM

## 2021-12-13 DIAGNOSIS — M17.12 OSTEOARTHRITIS OF LEFT KNEE, UNSPECIFIED OSTEOARTHRITIS TYPE: ICD-10-CM

## 2021-12-13 PROCEDURE — 99308 SBSQ NF CARE LOW MDM 20: CPT | Performed by: FAMILY MEDICINE

## 2021-12-20 ENCOUNTER — TELEPHONE (OUTPATIENT)
Dept: BEHAVIORAL/MENTAL HEALTH CLINIC | Facility: CLINIC | Age: 86
End: 2021-12-20

## 2021-12-22 ENCOUNTER — NURSING HOME VISIT (OUTPATIENT)
Dept: GERIATRICS | Facility: OTHER | Age: 86
End: 2021-12-22
Payer: COMMERCIAL

## 2021-12-22 DIAGNOSIS — M17.12 OSTEOARTHRITIS OF LEFT KNEE, UNSPECIFIED OSTEOARTHRITIS TYPE: ICD-10-CM

## 2021-12-22 DIAGNOSIS — R13.12 OROPHARYNGEAL DYSPHAGIA: ICD-10-CM

## 2021-12-22 DIAGNOSIS — K59.01 CONSTIPATION BY DELAYED COLONIC TRANSIT: Primary | ICD-10-CM

## 2021-12-22 PROCEDURE — 99309 SBSQ NF CARE MODERATE MDM 30: CPT | Performed by: FAMILY MEDICINE

## 2022-01-14 ENCOUNTER — NURSING HOME VISIT (OUTPATIENT)
Dept: GERIATRICS | Facility: OTHER | Age: 87
End: 2022-01-14
Payer: COMMERCIAL

## 2022-01-14 DIAGNOSIS — D50.9 IRON DEFICIENCY ANEMIA, UNSPECIFIED IRON DEFICIENCY ANEMIA TYPE: ICD-10-CM

## 2022-01-14 DIAGNOSIS — N18.31 STAGE 3A CHRONIC KIDNEY DISEASE (HCC): ICD-10-CM

## 2022-01-14 DIAGNOSIS — F01.50 VASCULAR DEMENTIA WITHOUT BEHAVIORAL DISTURBANCE (HCC): ICD-10-CM

## 2022-01-14 DIAGNOSIS — F41.9 ANXIETY: ICD-10-CM

## 2022-01-14 DIAGNOSIS — R13.12 OROPHARYNGEAL DYSPHAGIA: Primary | ICD-10-CM

## 2022-01-14 PROCEDURE — 99309 SBSQ NF CARE MODERATE MDM 30: CPT | Performed by: FAMILY MEDICINE

## 2022-01-14 NOTE — PROGRESS NOTES
[facility]  [address]  [phone]  Facility:  [place] [address]          NAME: Luis Márquez  AGE: 80 y o  SEX: female    DATE OF ENCOUNTER: 1/14/2022    Chief Complaint     Patient has no specific complaint    History of Present Illness     HPI    The following portions of the patient's history were reviewed and updated as appropriate (from facility chart and hospital records): allergies, current medications, past family history, past medical history, past social history, past surgical history and problem list   Patient was seen and examined for follow-up on dysphagia, dementia, CKD,TAHMINA, anxiety, and anemia  Last routine labs done in December and stable  Anxiety under control  Kidney function stable  No behaviors  Patient still has some joint pain on her left knee more than right knee, but overall stable  Tolerating pureed diet  She has been having some weight loss with variable meal completion, but overall in lab 1 year she has gained weight  H&H stable in December is labs  Patient continues with comfort measures and nursing home care  Review of Systems     Review of Systems   Unable to perform ROS: Dementia       Active Problem List     Patient Active Problem List   Diagnosis    Anemia    Cerebellar infarction ([address] )    CKD (chronic kidney disease) stage 3, GFR 30-59 ml/min ([facility])    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    Delirium    Dementia without behavioral disturbance ([address] )    Diastolic dysfunction    DJD (degenerative joint disease)    Dysphagia    Essential hypertension    Fall    Glucose intolerance (impaired glucose tolerance)    HH (hiatus hernia)    Hyperlipidemia    Left sided lacunar infarction ([address] )    Vitamin B 12 deficiency    Ambulatory dysfunction    Axillary lump, right    Delusional disorder ([address] )    Anxiety       Objective     Vitals:  Reviewed    Physical Exam  Vitals and nursing note reviewed     Constitutional: General: She is not in acute distress  Appearance: Normal appearance  She is well-developed  She is not ill-appearing, toxic-appearing or diaphoretic  HENT:      Head: Normocephalic and atraumatic  Right Ear: External ear normal       Left Ear: External ear normal       Ears:      Comments: Hard of hearing     Nose: Nose normal  No congestion or rhinorrhea  Mouth/Throat:      Mouth: Mucous membranes are moist       Comments: Missing teeth  Eyes:      General: No scleral icterus  Right eye: No discharge  Left eye: No discharge  Extraocular Movements: Extraocular movements intact  Conjunctiva/sclera: Conjunctivae normal       Pupils: Pupils are equal, round, and reactive to light  Cardiovascular:      Rate and Rhythm: Normal rate and regular rhythm  Heart sounds: Normal heart sounds  No murmur heard  No friction rub  No gallop  Pulmonary:      Effort: Pulmonary effort is normal  No respiratory distress  Breath sounds: Normal breath sounds  No stridor  No wheezing, rhonchi or rales  Chest:      Chest wall: No tenderness  Abdominal:      General: Bowel sounds are normal  There is no distension  Palpations: Abdomen is soft  There is no mass  Tenderness: There is no abdominal tenderness  There is no guarding or rebound  Hernia: No hernia is present  Genitourinary:     Comments: Deferred  Musculoskeletal:         General: Tenderness (With left knee extension) present  No swelling, deformity or signs of injury  Normal range of motion  Cervical back: Normal range of motion and neck supple  No rigidity or tenderness  Right lower leg: No edema  Left lower leg: No edema  Comments: Sitting in wheelchair with overall limited range of motion  Lymphadenopathy:      Cervical: No cervical adenopathy  Skin:     General: Skin is warm and dry  Coloration: Skin is not jaundiced or pale        Findings: No bruising, erythema, lesion or rash  Comments: Didn't examine sacral area  Neurological:      Mental Status: She is alert  Cranial Nerves: Cranial nerve deficit (Hard of hearing) present  Comments: Follows simple commands, not oriented  Psychiatric:         Behavior: Behavior normal          Pertinent Laboratory/Diagnostic Studies:    No labs this visit, last lab done in December  Current Medications   Medication list in facility chart was reviewed and necessary changes made  Assessment and Plan     Dysphagia  Tolerating pureed, thin liquid diet, with some weight loss, will continue with close monitoring  Dementia without behavioral disturbance ([facility] )  Some weight loss, will continue with close monitoring, needs nursing home care  CKD (chronic kidney disease) stage 3, GFR 30-59 ml/min ([facility])  Last lab done in December, stable, will continue with avoiding nephrotoxic agents, and monitoring  Anxiety  Overall stable with Celexa, will continue with monitoring, electrolytes stable in December labs  Anemia  On iron, H&H stable on December labs    Continue with monitoring      Liborio Bright MD  1/14/20226:11 PM

## 2022-02-14 ENCOUNTER — NURSING HOME VISIT (OUTPATIENT)
Dept: GERIATRICS | Facility: OTHER | Age: 87
End: 2022-02-14
Payer: COMMERCIAL

## 2022-02-14 DIAGNOSIS — F41.9 ANXIETY: ICD-10-CM

## 2022-02-14 DIAGNOSIS — F01.50 VASCULAR DEMENTIA WITHOUT BEHAVIORAL DISTURBANCE (HCC): Primary | ICD-10-CM

## 2022-02-14 DIAGNOSIS — R13.12 OROPHARYNGEAL DYSPHAGIA: ICD-10-CM

## 2022-02-14 DIAGNOSIS — N18.31 STAGE 3A CHRONIC KIDNEY DISEASE (HCC): ICD-10-CM

## 2022-02-14 DIAGNOSIS — I10 ESSENTIAL HYPERTENSION: ICD-10-CM

## 2022-02-14 PROCEDURE — 99309 SBSQ NF CARE MODERATE MDM 30: CPT | Performed by: FAMILY MEDICINE

## 2022-02-14 NOTE — PROGRESS NOTES
[facility]  [address]  [phone]  Facility:  [facility] [place]          NAME: Blossom Mike  AGE: 80 y o  SEX: female    DATE OF ENCOUNTER: 2/14/2022    Chief Complaint     Patient has no specific complaint    History of Present Illness     HPI    The following portions of the patient's history were reviewed and updated as appropriate (from facility chart and hospital records): allergies, current medications, past family history, past medical history, past social history, past surgical history and problem list   Patient was seen and examined for follow-up on dementia, CKD, dysphagia, anxiety, and anemia  Last lab done in January and stable  She continues with nursing home care and comfort measures  Continues with gradual decline  Anxiety under control with current medications  H&H stable  No specific issues or concerns  Tolerating pureed diet  Weight has been stable  Review of Systems     Review of Systems   Unable to perform ROS: Dementia       Active Problem List     Patient Active Problem List   Diagnosis    Anemia    Cerebellar infarction ([address] )    CKD (chronic kidney disease) stage 3, GFR 30-59 ml/min ([facility])    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    Delirium    Dementia without behavioral disturbance ([address] )    Diastolic dysfunction    DJD (degenerative joint disease)    Dysphagia    Essential hypertension    Fall    Glucose intolerance (impaired glucose tolerance)    HH (hiatus hernia)    Hyperlipidemia    Left sided lacunar infarction ([address] )    Vitamin B 12 deficiency    Ambulatory dysfunction    Axillary lump, right    Delusional disorder ([address] )    Anxiety       Objective     Vitals:  Reviewed    Physical Exam  Vitals and nursing note reviewed  Constitutional:       General: She is not in acute distress  Appearance: She is well-developed  She is not ill-appearing, toxic-appearing or diaphoretic     HENT:      Head: Normocephalic and atraumatic  Right Ear: External ear normal       Left Ear: External ear normal       Ears:      Comments: Hard of hearing     Nose: Nose normal  No congestion or rhinorrhea  Mouth/Throat:      Mouth: Mucous membranes are moist    Eyes:      General: No scleral icterus  Right eye: No discharge  Left eye: No discharge  Conjunctiva/sclera: Conjunctivae normal       Pupils: Pupils are equal, round, and reactive to light  Cardiovascular:      Rate and Rhythm: Normal rate and regular rhythm  Heart sounds: Normal heart sounds  No murmur heard  No friction rub  No gallop  Pulmonary:      Effort: Pulmonary effort is normal  No respiratory distress  Breath sounds: Normal breath sounds  No stridor  No wheezing, rhonchi or rales  Chest:      Chest wall: No tenderness  Abdominal:      General: Bowel sounds are normal  There is no distension  Palpations: Abdomen is soft  There is no mass  Tenderness: There is no abdominal tenderness  There is no guarding or rebound  Hernia: No hernia is present  Genitourinary:     Comments: Deferred  Musculoskeletal:         General: No swelling, tenderness, deformity or signs of injury  Cervical back: Normal range of motion and neck supple  No rigidity or tenderness  Right lower leg: No edema  Left lower leg: No edema  Comments: Sitting in her wheelchair, limited range of motion  Lymphadenopathy:      Cervical: No cervical adenopathy  Skin:     General: Skin is warm and dry  Coloration: Skin is not jaundiced or pale  Findings: No bruising, erythema, lesion or rash  Comments: Didn't examine sacral area  Neurological:      Mental Status: She is alert  Cranial Nerves: Cranial nerve deficit (Hard of hearing) present  Comments: Limited secondary to patient's dementia, follows simple commands     Psychiatric:         Behavior: Behavior normal          Pertinent Laboratory/Diagnostic Studies:  1/26:  CBC, BMP 9/18: Iron studies    Current Medications   Medication list in facility chart was reviewed and no changes made  Assessment and Plan     Dementia without behavioral disturbance ([facility] )  Continues with slow gradual decline, needs nursing home care  On comfort measures  Weight has been stable  Dysphagia  Weight stable, tolerating pureed, thin liquid diet  Essential hypertension  No meds, blood pressure stable  Will continue with monitoring  CKD (chronic kidney disease) stage 3, GFR 30-59 ml/min ([facility])  Last lab in January stable, will continue with monitoring and avoiding nephrotoxic agents  Anxiety  Controlled with Celexa, labs stable on January 26  Will continue with monitoring        Tricia Tadeo MD  4/49/97099:41 PM

## 2022-02-14 NOTE — ASSESSMENT & PLAN NOTE
Continues with slow gradual decline, needs nursing home care  On comfort measures  Weight has been stable

## 2022-03-13 PROBLEM — K21.9 GASTROESOPHAGEAL REFLUX DISEASE WITHOUT ESOPHAGITIS: Status: ACTIVE | Noted: 2022-03-13

## 2022-03-14 ENCOUNTER — NURSING HOME VISIT (OUTPATIENT)
Dept: GERIATRICS | Facility: OTHER | Age: 87
End: 2022-03-14
Payer: COMMERCIAL

## 2022-03-14 DIAGNOSIS — R13.12 OROPHARYNGEAL DYSPHAGIA: Primary | ICD-10-CM

## 2022-03-14 DIAGNOSIS — K21.9 GASTROESOPHAGEAL REFLUX DISEASE WITHOUT ESOPHAGITIS: ICD-10-CM

## 2022-03-14 DIAGNOSIS — F01.50 VASCULAR DEMENTIA WITHOUT BEHAVIORAL DISTURBANCE (HCC): ICD-10-CM

## 2022-03-14 DIAGNOSIS — F41.9 ANXIETY: ICD-10-CM

## 2022-03-14 DIAGNOSIS — N18.31 STAGE 3A CHRONIC KIDNEY DISEASE (HCC): ICD-10-CM

## 2022-03-14 PROCEDURE — 99309 SBSQ NF CARE MODERATE MDM 30: CPT | Performed by: FAMILY MEDICINE

## 2022-03-14 NOTE — ASSESSMENT & PLAN NOTE
Occasional increased anxiety with movements/ transfer,but overall stable with Celexa  Last lab done in December/Jana[place]

## 2022-03-14 NOTE — PROGRESS NOTES
[facility]  [address]  [phone]  [facility]          NAME: Mariam Scherer  AGE: 80 y o  SEX: female    DATE OF ENCOUNTER: 3/14/2022    Chief Complaint     Pt has no specific complaint     History of Present Illness     HPI    The following portions of the patient's history were reviewed and updated as appropriate (from facility chart and hospital records): allergies, current medications, past family history, past medical history, past social history, past surgical history and problem list  Pt was seen and examined for f/u on Dysphagia, Dementia, CKD, Anxiety, HTN  Some wt gain and then wt loss but overall wt has been stable  Tolerating pureed diet, and swallowing better with added Famotidinr, last lab done in December/January    On comfort measures  No behaviors  Per staff her anxiety is overall better, and anxiety episodes are more when transferring or with standing up  Review of Systems     Review of Systems   Unable to perform ROS: Dementia   Musculoskeletal:        Denies any pain       Active Problem List     Patient Active Problem List   Diagnosis    Anemia    Cerebellar infarction ([address] )    CKD (chronic kidney disease) stage 3, GFR 30-59 ml/min ([facility])    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    Delirium    Dementia without behavioral disturbance ([address] )    Diastolic dysfunction    DJD (degenerative joint disease)    Dysphagia    Essential hypertension    Fall    Glucose intolerance (impaired glucose tolerance)    HH (hiatus hernia)    Hyperlipidemia    Left sided lacunar infarction ([address] )    Vitamin B 12 deficiency    Ambulatory dysfunction    Axillary lump, right    Delusional disorder ([address] )    Anxiety    Gastroesophageal reflux disease without esophagitis       Objective     Vitals: Reviewed     Physical Exam  Vitals and nursing note reviewed  Constitutional:       General: She is not in acute distress       Appearance: Normal appearance  She is well-developed  She is not ill-appearing, toxic-appearing or diaphoretic  HENT:      Head: Normocephalic and atraumatic  Right Ear: External ear normal       Left Ear: External ear normal       Nose: Nose normal  No congestion or rhinorrhea  Mouth/Throat:      Mouth: Mucous membranes are moist    Eyes:      General: No scleral icterus  Right eye: No discharge  Left eye: No discharge  Extraocular Movements: Extraocular movements intact  Conjunctiva/sclera: Conjunctivae normal       Pupils: Pupils are equal, round, and reactive to light  Cardiovascular:      Rate and Rhythm: Normal rate and regular rhythm  Heart sounds: Normal heart sounds  No murmur heard  No friction rub  No gallop  Pulmonary:      Effort: Pulmonary effort is normal  No respiratory distress  Breath sounds: Normal breath sounds  No stridor  No wheezing, rhonchi or rales  Chest:      Chest wall: No tenderness  Abdominal:      General: Bowel sounds are normal  There is no distension  Palpations: Abdomen is soft  There is no mass  Tenderness: There is no abdominal tenderness  There is no guarding or rebound  Hernia: No hernia is present  Genitourinary:     Comments: Deferred  Musculoskeletal:         General: No swelling, tenderness, deformity or signs of injury  Cervical back: Normal range of motion and neck supple  No rigidity or tenderness  Right lower leg: No edema  Left lower leg: No edema  Comments: Sitting in her WC, limited ROM   Lymphadenopathy:      Cervical: No cervical adenopathy  Skin:     General: Skin is warm and dry  Coloration: Skin is not jaundiced or pale  Findings: No bruising, erythema, lesion or rash  Comments: Didn't examine sacral area  Neurological:      Mental Status: She is alert  Cranial Nerves: Cranial nerve deficit ([place]) present  Comments: Forgetful  Not oriented,follows commands  Psychiatric:         Behavior: Behavior normal          Pertinent Laboratory/Diagnostic Studies:  1/26:CBC, 2/23: CBC, CMp    Current Medications   Medication list in facility chart was reviewed and no changes made  Assessment and Plan   Dysphagia  On pureed diet, continues with NH care  Stable with added Famotidine  Gastroesophageal reflux disease without esophagitis  On Famotidine  Stable  Last CBC done in January and stable  Dementia without behavioral disturbance (HCC)  Gradual decline, needs NH Care  Wt overall stable  Needs NH care  On comfort measures, will continue with monitoring  CKD (chronic kidney disease) stage 3, GFR 30-59 ml/min (HCC)  Last lab done in January  Stable, will continue with monitoring and avoiding nephrotoxic agents  Anxiety  Occasional increased anxiety with movements/ transfer,but overall stable with Celexa  Last lab done in December/Faizan Levi MD  3/05/49523:32 PM

## 2022-03-14 NOTE — ASSESSMENT & PLAN NOTE
Gradual decline, needs NH Care  Wt overall stable  Needs NH care  On comfort measures, will continue with monitoring

## 2022-04-15 ENCOUNTER — NURSING HOME VISIT (OUTPATIENT)
Dept: GERIATRICS | Facility: OTHER | Age: 87
End: 2022-04-15
Payer: COMMERCIAL

## 2022-04-15 DIAGNOSIS — K21.9 GASTROESOPHAGEAL REFLUX DISEASE WITHOUT ESOPHAGITIS: ICD-10-CM

## 2022-04-15 DIAGNOSIS — N18.31 STAGE 3A CHRONIC KIDNEY DISEASE (HCC): ICD-10-CM

## 2022-04-15 DIAGNOSIS — F41.9 ANXIETY: ICD-10-CM

## 2022-04-15 DIAGNOSIS — F01.50 VASCULAR DEMENTIA WITHOUT BEHAVIORAL DISTURBANCE (HCC): ICD-10-CM

## 2022-04-15 DIAGNOSIS — I10 ESSENTIAL HYPERTENSION: ICD-10-CM

## 2022-04-15 DIAGNOSIS — R13.12 OROPHARYNGEAL DYSPHAGIA: Primary | ICD-10-CM

## 2022-04-15 PROCEDURE — 99309 SBSQ NF CARE MODERATE MDM 30: CPT | Performed by: FAMILY MEDICINE

## 2022-04-15 RX ORDER — FAMOTIDINE 20 MG/1
20 TABLET, FILM COATED ORAL DAILY
COMMUNITY

## 2022-04-15 NOTE — PROGRESS NOTES
[facility]  [address]  [phone]  Facility: [place] [address]          NAME: Ke Rivera  AGE: 80 y o  SEX: female    DATE OF ENCOUNTER: 4/15/2022    Chief Complaint     Pt has no specific complaint today     History of Present Illness     HPI    The following portions of the patient's history were reviewed and updated as appropriate (from facility chart and hospital records): allergies, current medications, past family history, past medical history, past social history, past surgical history and problem list  Pt was seen and examined for f/u on Dysphagia, GERD, CKd, anxiety  Pt's knees pain has been stable  Per nurse Kae Fraser she has no issues with pureed diet  ST on board and recommended if GERD symptoms insists or worsening to consult GI  Continues with NH care with comfort measures  Last lab done on 4/13  BP stable  Review of Systems     Review of Systems   Unable to perform ROS: Dementia   Musculoskeletal:        Denies having any pain at this time        Active Problem List     Patient Active Problem List   Diagnosis    Anemia    Cerebellar infarction ([address] )    CKD (chronic kidney disease) stage 3, GFR 30-59 ml/min ([facility])    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    Delirium    Dementia without behavioral disturbance ([address] )    Diastolic dysfunction    DJD (degenerative joint disease)    Dysphagia    Essential hypertension    Fall    Glucose intolerance (impaired glucose tolerance)    HH (hiatus hernia)    Hyperlipidemia    Left sided lacunar infarction ([address] )    Vitamin B 12 deficiency    Ambulatory dysfunction    Axillary lump, right    Delusional disorder ([address] )    Anxiety    Gastroesophageal reflux disease without esophagitis       Objective     Vitals: reviewed     Physical Exam  Vitals reviewed  Constitutional:       General: She is not in acute distress  Appearance: Normal appearance  She is well-developed   She is not ill-appearing, toxic-appearing or diaphoretic  HENT:      Head: Normocephalic and atraumatic  Right Ear: External ear normal       Left Ear: External ear normal       Ears:      Comments: [place]     Nose: Nose normal  No congestion or rhinorrhea  Mouth/Throat:      Mouth: Mucous membranes are moist       Comments: Dentures   Eyes:      General: No scleral icterus  Right eye: No discharge  Left eye: No discharge  Extraocular Movements: Extraocular movements intact  Conjunctiva/sclera: Conjunctivae normal       Pupils: Pupils are equal, round, and reactive to light  Cardiovascular:      Rate and Rhythm: Normal rate and regular rhythm  Heart sounds: Normal heart sounds  No murmur heard  No friction rub  No gallop  Pulmonary:      Effort: Pulmonary effort is normal  No respiratory distress  Breath sounds: Normal breath sounds  No stridor  No wheezing, rhonchi or rales  Chest:      Chest wall: No tenderness  Abdominal:      General: Bowel sounds are normal  There is no distension  Palpations: Abdomen is soft  There is no mass  Tenderness: There is no abdominal tenderness  There is no guarding or rebound  Hernia: No hernia is present  Genitourinary:     Comments: Deferred  Musculoskeletal:         General: No swelling, tenderness, deformity or signs of injury  Cervical back: Normal range of motion and neck supple  No rigidity or tenderness  Right lower leg: No edema  Left lower leg: No edema  Comments: Sitting in [place], limited ROM  Lymphadenopathy:      Cervical: No cervical adenopathy  Skin:     General: Skin is warm and dry  Coloration: Skin is not jaundiced or pale  Findings: Erythema present  No bruising, lesion or rash  Comments: Didn't examine sacral area  Neurological:      Mental Status: She is alert  Cranial Nerves: Cranial nerve deficit ([place]) present     Psychiatric:         Behavior: Behavior normal  Pertinent Laboratory/Diagnostic Studies:  4/13: CBC, CMP, Mg    Current Medications   Medication list in facility chart was reviewed and necessary changes made  Assessment and Plan     Dysphagia  Tolerating pureed diet, partly 2nd to GERD, on Famotidine  ST recommended GI consult if GERD symptoms persists but will not do it considering pt's Dementia, and whole goal of care  Gastroesophageal reflux disease without esophagitis  Overall stable with Famotidine, no GI consult due to pt's age, dementia and overall goal of comfort measures  Essential hypertension  BP stable, no meds  Will continue with monitoring  CKD (chronic kidney disease) stage 3, GFR 30-59 ml/min ([facility])  Labs this month stable  Will continue with monitoring  Anxiety  Stable with Celexa, last lab on 4/13 stable  Will continue with monitoring  Dementia without behavioral disturbance ([facility])  Wt stable  Will continue with monitoring, needs NH care, comfort measures         Mert Hall MD  4/22/83837:56 PM

## 2022-04-15 NOTE — ASSESSMENT & PLAN NOTE
Overall stable with Famotidine, no GI consult due to pt's age, dementia and overall goal of comfort measures  O-T Advancement Flap Text: The defect edges were debeveled with a #15 scalpel blade.  Given the location of the defect, shape of the defect and the proximity to free margins an O-T advancement flap was deemed most appropriate.  Using a sterile surgical marker, an appropriate advancement flap was drawn incorporating the defect and placing the expected incisions within the relaxed skin tension lines where possible.    The area thus outlined was incised deep to adipose tissue with a #15 scalpel blade.  The skin margins were undermined to an appropriate distance in all directions utilizing iris scissors.

## 2022-04-15 NOTE — ASSESSMENT & PLAN NOTE
Tolerating pureed diet, partly 2nd to GERD, on Famotidine  ST recommended GI consult if GERD symptoms persists but will not do it considering pt's Dementia, and whole goal of care

## 2022-04-25 ENCOUNTER — NURSING HOME VISIT (OUTPATIENT)
Dept: GERIATRICS | Facility: OTHER | Age: 87
End: 2022-04-25
Payer: COMMERCIAL

## 2022-04-25 DIAGNOSIS — R10.84 GENERALIZED ABDOMINAL PAIN: Primary | ICD-10-CM

## 2022-04-25 PROCEDURE — 99308 SBSQ NF CARE LOW MDM 20: CPT | Performed by: FAMILY MEDICINE

## 2022-04-25 NOTE — ASSESSMENT & PLAN NOTE
Of different part of abdomen, ?? Constipation +/- flatus related, pt was given 2 tums, stat abdominal xray was ordered to R/O any obstruction  Will monitor closely  Pt has received her routine Tylenol

## 2022-04-25 NOTE — PROGRESS NOTES
[facility]  [address]  [phone]  Facility: [place] [address]          NAME: Joseluis Marin  AGE: 80 y o  SEX: female    DATE OF ENCOUNTER: 4/25/2022    Chief Complaint     Pt is not specific about any symptoms     History of Present Illness     HPI    The following portions of the patient's history were reviewed and updated as appropriate (from facility chart and hospital records): allergies, current medications, past family history, past medical history, past social history, past surgical history and problem list  Pt was seen per staff request and report of pt yelling out and pointing to left side of her abdomen  Pt has had medium BM 2-3 days ago  On exam pt is not specific about her symptoms, points to different part of her abdomen and states that "it doesn't hurt!"  Meal completion variable as it has been  Review of Systems     Review of Systems   Unable to perform ROS: Dementia   Constitutional:        "hurry up!"   Gastrointestinal: Negative for abdominal pain ("there is no pain! ")  Active Problem List     Patient Active Problem List   Diagnosis    Anemia    Cerebellar infarction ([address] )    CKD (chronic kidney disease) stage 3, GFR 30-59 ml/min ([facility])    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    Delirium    Dementia without behavioral disturbance ([address] )    Diastolic dysfunction    DJD (degenerative joint disease)    Dysphagia    Essential hypertension    Fall    Glucose intolerance (impaired glucose tolerance)    HH (hiatus hernia)    Hyperlipidemia    Left sided lacunar infarction ([address] )    Vitamin B 12 deficiency    Ambulatory dysfunction    Axillary lump, right    Delusional disorder ([address] )    Anxiety    Gastroesophageal reflux disease without esophagitis    Generalized abdominal pain       Objective     Vitals: Reviewed     Physical Exam  Constitutional:       General: She is not in acute distress       Appearance: She is not ill-appearing, toxic-appearing or diaphoretic  HENT:      Ears:      Comments: [place]     Nose: Nose normal  No congestion or rhinorrhea  Mouth/Throat:      Mouth: Mucous membranes are moist    Eyes:      Pupils: Pupils are equal, round, and reactive to light  Cardiovascular:      Rate and Rhythm: Normal rate and regular rhythm  Heart sounds: Normal heart sounds  No murmur heard  No gallop  Pulmonary:      Effort: No respiratory distress  Breath sounds: Normal breath sounds  No stridor  No wheezing, rhonchi or rales  Chest:      Chest wall: No tenderness  Abdominal:      General: There is no distension  Palpations: There is no mass  Tenderness: There is no abdominal tenderness (??? if any tenderness )  There is no guarding or rebound  Hernia: A hernia (mid abdomina line ) is present  Comments: Hyperactive BS  Musculoskeletal:      Cervical back: Normal range of motion and neck supple  No rigidity or tenderness  Comments: In bed, lying in bed with limited ROM   Skin:     General: Skin is warm  Coloration: Skin is not jaundiced or pale  Findings: No bruising, erythema, lesion or rash  Neurological:      Mental Status: She is alert  Cranial Nerves: Cranial nerve deficit ([place]) present  Pertinent Laboratory/Diagnostic Studies:  No lab for this visit     Current Medications   Medication list in facility chart was reviewed and necessary changes made  Assessment and Plan   Generalized abdominal pain  Of different part of abdomen, ?? Constipation +/- flatus related, pt was given 2 tums, stat abdominal xray was ordered to R/O any obstruction  Will monitor closely  Pt has received her routine Tylenol           Mirna Olson MD  1/41/46933:09 PM

## 2022-05-21 ENCOUNTER — TELEPHONE (OUTPATIENT)
Dept: OTHER | Facility: OTHER | Age: 87
End: 2022-05-21

## 2022-05-21 NOTE — TELEPHONE ENCOUNTER
Azar Busby calling from [facility] regarding needing an order for "change of condition" ( the line was staticy)       Paged Dr Alma Rosa Ruvalcaba

## 2022-05-23 ENCOUNTER — NURSING HOME VISIT (OUTPATIENT)
Dept: GERIATRICS | Facility: OTHER | Age: 87
End: 2022-05-23
Payer: COMMERCIAL

## 2022-05-23 DIAGNOSIS — N18.31 STAGE 3A CHRONIC KIDNEY DISEASE (HCC): ICD-10-CM

## 2022-05-23 DIAGNOSIS — I10 ESSENTIAL HYPERTENSION: ICD-10-CM

## 2022-05-23 DIAGNOSIS — F01.50 VASCULAR DEMENTIA WITHOUT BEHAVIORAL DISTURBANCE (HCC): ICD-10-CM

## 2022-05-23 DIAGNOSIS — R13.12 OROPHARYNGEAL DYSPHAGIA: ICD-10-CM

## 2022-05-23 DIAGNOSIS — K21.9 GASTROESOPHAGEAL REFLUX DISEASE WITHOUT ESOPHAGITIS: ICD-10-CM

## 2022-05-23 DIAGNOSIS — J18.9 PNEUMONIA OF BOTH LOWER LOBES DUE TO INFECTIOUS ORGANISM: Primary | ICD-10-CM

## 2022-05-23 PROBLEM — R41.0 DELIRIUM: Status: RESOLVED | Noted: 2021-01-11 | Resolved: 2022-05-23

## 2022-05-23 PROCEDURE — 99309 SBSQ NF CARE MODERATE MDM 30: CPT | Performed by: FAMILY MEDICINE

## 2022-05-23 NOTE — PROGRESS NOTES
[facility]  [address]  [phone]  [facility]        NAME: Anastasio Landau  AGE: 80 y o  SEX: female    DATE OF ENCOUNTER: 5/23/2022    Chief Complaint     Pt has no specific complaint     History of Present Illness     HPI    The following portions of the patient's history were reviewed and updated as appropriate (from facility chart and hospital records): allergies, current medications, past family history, past medical history, past social history, past surgical history and problem list  Pt was seen and examined for f/u on Dysphagia, HTN, GERD, CKD, anxiety, Dementia  Pt continues with NH care  Pt had increased cough during weekend and had CXR on 5/22 showing Bibasilar opacity and was started on Augmentin and neb treatment, and cough syrup  Pt denies any SOB, [place]  She has some coughs during exam  BP stable  No behaviors  Pt has some cough episodes after meals but overall tolerating pureed diet GERD symptoms controlled  Labs on 5/22 stable  Continues with NH care and comfort measures       Review of Systems     Review of Systems   Reason unable to perform ROS: overall limited due to dementia        Active Problem List     Patient Active Problem List   Diagnosis    Anemia    Cerebellar infarction ([address] )    CKD (chronic kidney disease) stage 3, GFR 30-59 ml/min ([facility])    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    Dementia without behavioral disturbance ([address] )    Diastolic dysfunction    DJD (degenerative joint disease)    Dysphagia    Essential hypertension    Fall    Glucose intolerance (impaired glucose tolerance)    HH (hiatus hernia)    Hyperlipidemia    Left sided lacunar infarction ([address] )    Vitamin B 12 deficiency    Ambulatory dysfunction    Axillary lump, right    Delusional disorder ([address] )    Anxiety    Gastroesophageal reflux disease without esophagitis    Generalized abdominal pain    Pneumonia due to infectious organism       Objective Vitals: Reviewed     Physical Exam  Vitals and nursing note reviewed  Constitutional:       General: She is not in acute distress  Appearance: She is well-developed  She is not ill-appearing, toxic-appearing or diaphoretic  HENT:      Head: Normocephalic and atraumatic  Right Ear: External ear normal       Left Ear: External ear normal       Nose: Nose normal  No congestion or rhinorrhea  Mouth/Throat:      Mouth: Mucous membranes are moist       Comments: Dentures   Eyes:      General: No scleral icterus  Right eye: No discharge  Left eye: No discharge  Extraocular Movements: Extraocular movements intact  Conjunctiva/sclera: Conjunctivae normal       Pupils: Pupils are equal, round, and reactive to light  Cardiovascular:      Rate and Rhythm: Normal rate and regular rhythm  Heart sounds: Normal heart sounds  No murmur heard  No friction rub  No gallop  Pulmonary:      Effort: Pulmonary effort is normal  No respiratory distress  Breath sounds: No stridor  No wheezing, rhonchi or rales  Comments: Diminished BS  Chest:      Chest wall: No tenderness  Abdominal:      General: Bowel sounds are normal  There is no distension  Palpations: Abdomen is soft  There is no mass  Tenderness: There is no abdominal tenderness  There is no guarding or rebound  Hernia: No hernia is present  Genitourinary:     Comments: Deferred  Musculoskeletal:         General: No swelling, tenderness, deformity or signs of injury  Cervical back: Normal range of motion and neck supple  No rigidity or tenderness  Right lower leg: No edema  Left lower leg: No edema  Comments: In [place], sitting, limited ROM  Lymphadenopathy:      Cervical: No cervical adenopathy  Skin:     General: Skin is warm and dry  Coloration: Skin is not jaundiced or pale  Findings: Erythema present  No bruising, lesion or rash        Comments: Didn't examine sacral area  Neurological:      Cranial Nerves: Cranial nerve deficit ([place]) present  Comments: Forgetful, overall limited with pt's dementia  Psychiatric:         Behavior: Behavior normal          Pertinent Laboratory/Diagnostic Studies:  CXR, Cbc, CMP done 5/22    Current Medications   Medication list in facility chart was reviewed and no changes made  Assessment and Plan   Pneumonia due to infectious organism  On Augmentin, Neb tx and cough syrup, will continue with monitoring  Essential hypertension  No meds, BP stable  Will continue with monitoring  Dysphagia  Tolerating pureed diet  Will continue with monitoring  Dementia without behavioral disturbance ([facility] [address] )  No behaviors, will continue with NH care and comfort measures  CKD (chronic kidney disease) stage 3, GFR 30-59 ml/min ([facility])  Labs on 5/22 stable  Will continue with monitoring  Gastroesophageal reflux disease without esophagitis  Stable with Famotidine  Will continue with monitoring           Padma Abdi MD  6/38/62327:79 PM

## 2022-06-22 ENCOUNTER — NURSING HOME VISIT (OUTPATIENT)
Dept: GERIATRICS | Facility: OTHER | Age: 87
End: 2022-06-22
Payer: COMMERCIAL

## 2022-06-22 DIAGNOSIS — F41.9 ANXIETY: ICD-10-CM

## 2022-06-22 DIAGNOSIS — F01.50 VASCULAR DEMENTIA WITHOUT BEHAVIORAL DISTURBANCE (HCC): ICD-10-CM

## 2022-06-22 DIAGNOSIS — R13.12 OROPHARYNGEAL DYSPHAGIA: Primary | ICD-10-CM

## 2022-06-22 DIAGNOSIS — N18.31 STAGE 3A CHRONIC KIDNEY DISEASE (HCC): ICD-10-CM

## 2022-06-22 DIAGNOSIS — K21.9 GASTROESOPHAGEAL REFLUX DISEASE WITHOUT ESOPHAGITIS: ICD-10-CM

## 2022-06-22 PROBLEM — J18.9 PNEUMONIA DUE TO INFECTIOUS ORGANISM: Status: RESOLVED | Noted: 2022-05-23 | Resolved: 2022-06-22

## 2022-06-22 PROCEDURE — 99309 SBSQ NF CARE MODERATE MDM 30: CPT | Performed by: FAMILY MEDICINE

## 2022-06-22 NOTE — PROGRESS NOTES
[facility]  [address]  [phone]  Facility: [place] [address]          NAME: Jesus Brody  AGE: 80 y o  SEX: female    DATE OF ENCOUNTER: 6/22/2022 late entry     Chief Complaint     Pt has no specific complaint     History of Present Illness     HPI    The following portions of the patient's history were reviewed and updated as appropriate (from facility chart and hospital records): allergies, current medications, past family history, past medical history, past social history, past surgical history and problem list  Pt was seen and examined for f/u on HTN, Anxiety,Dysphagia, GERD, CKd, and Dementia  Pt continues with NH care  BP stable  Wt overall stable  Continues with NH care, and comfort measures  BP stable  Tolerating pureed, thick liquid diet  Review of Systems     Review of Systems   Unable to perform ROS: Dementia       Active Problem List     Patient Active Problem List   Diagnosis    Anemia    Cerebellar infarction ([address] )    CKD (chronic kidney disease) stage 3, GFR 30-59 ml/min ([facility])    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    Dementia without behavioral disturbance ([address] )    Diastolic dysfunction    DJD (degenerative joint disease)    Dysphagia    Essential hypertension    Fall    Glucose intolerance (impaired glucose tolerance)    HH (hiatus hernia)    Hyperlipidemia    Left sided lacunar infarction ([address] )    Vitamin B 12 deficiency    Ambulatory dysfunction    Axillary lump, right    Delusional disorder ([address] )    Anxiety    Gastroesophageal reflux disease without esophagitis    Generalized abdominal pain       Objective     Vitals: Reviewed     Physical Exam  Vitals reviewed  Constitutional:       General: She is not in acute distress  Appearance: She is well-developed  She is not ill-appearing, toxic-appearing or diaphoretic  HENT:      Head: Normocephalic and atraumatic        Right Ear: External ear normal       Left Ear: External ear normal       Nose: Nose normal  No congestion or rhinorrhea  Mouth/Throat:      Comments: Missing teeth   Eyes:      General: No scleral icterus  Right eye: No discharge  Left eye: No discharge  Extraocular Movements: Extraocular movements intact  Conjunctiva/sclera: Conjunctivae normal       Pupils: Pupils are equal, round, and reactive to light  Cardiovascular:      Rate and Rhythm: Normal rate and regular rhythm  Heart sounds: Normal heart sounds  No murmur heard  No friction rub  No gallop  Pulmonary:      Effort: Pulmonary effort is normal  No respiratory distress  Breath sounds: Normal breath sounds  No stridor  No wheezing, rhonchi or rales  Chest:      Chest wall: No tenderness  Abdominal:      General: Bowel sounds are normal  There is no distension  Palpations: Abdomen is soft  There is no mass  Tenderness: There is no abdominal tenderness  There is no guarding or rebound  Hernia: No hernia is present  Genitourinary:     Comments: Deferred  Musculoskeletal:         General: No swelling, tenderness, deformity or signs of injury  Cervical back: Normal range of motion and neck supple  No rigidity or tenderness  Right lower leg: No edema  Left lower leg: No edema  Comments: In [place], stting  Limited ROM  Lymphadenopathy:      Cervical: No cervical adenopathy  Skin:     General: Skin is warm and dry  Coloration: Skin is not jaundiced or pale  Findings: No bruising, erythema, lesion or rash  Comments: Didn't examine sacral area  Neurological:      Mental Status: She is alert  Cranial Nerves: Cranial nerve deficit ([place]) present  Comments: Limited with pt's dementia    Psychiatric:         Behavior: Behavior normal          Pertinent Laboratory/Diagnostic Studies:  5/22: CBC, CMP    Current Medications   Medication list in facility chart was reviewed and necessary changes made  Assessment and Plan   Dysphagia  Tolerating pureed, thick liquid diet  Wt stable  Will continue with monitoring  Gastroesophageal reflux disease without esophagitis  Stable with Famotidine  Will continue with monitoring  Dementia without behavioral disturbance (HCC)  Wt stable  Continues with gradual decline  On comfort measures  Needs NH Care  CKD (chronic kidney disease) stage 3, GFR 30-59 ml/min (HCC)  Will continue with monitoring and avoiding nephrotoxic agents  Anxiety  Continues with low dose of Celexa  Stable at base             Ledy Fan MD

## 2022-07-21 ENCOUNTER — NURSING HOME VISIT (OUTPATIENT)
Dept: GERIATRICS | Facility: OTHER | Age: 87
End: 2022-07-21
Payer: COMMERCIAL

## 2022-07-21 DIAGNOSIS — K59.01 CONSTIPATION BY DELAYED COLONIC TRANSIT: ICD-10-CM

## 2022-07-21 DIAGNOSIS — F01.50 VASCULAR DEMENTIA WITHOUT BEHAVIORAL DISTURBANCE (HCC): Primary | ICD-10-CM

## 2022-07-21 DIAGNOSIS — K44.9 HH (HIATUS HERNIA): ICD-10-CM

## 2022-07-21 DIAGNOSIS — Z86.73 HISTORY OF CVA (CEREBROVASCULAR ACCIDENT): ICD-10-CM

## 2022-07-21 DIAGNOSIS — R13.12 OROPHARYNGEAL DYSPHAGIA: ICD-10-CM

## 2022-07-21 PROCEDURE — 99309 SBSQ NF CARE MODERATE MDM 30: CPT | Performed by: NURSE PRACTITIONER

## 2022-07-21 NOTE — PROGRESS NOTES
[address], [address] [place] [address], [address] [place] [address]  [phone]    NAME: Ulysses Iba  AGE: 80 y o  SEX: female    Progress Note    Location: [facility]  POS: 32 (LTC)    Assessment/Plan:    Dementia without behavioral disturbance (HCC)  Stable  Continue LCTF supportive care  Weight stable with fair to good meal completion: 51-75%  Continue fall precaution  Continue reorientation/redirection as often as needed  Continue to monitor hydration/nutritional intake  Continue Vit B12 1000mcg daily  Continue Citalopram 10mg daily/ Melatonin 3mg daily at    Current code status/ Goal: Comfort measures  DNR/DNI/DNH  History of CVA (cerebrovascular accident)  BP and HR historically stable  Not on statin and anticoagulation therapy    Dysphagia  Doing well with current diet modification  Weight stable  Continue LEROY, Puree texture with Nectar thick liquids    HH (hiatus hernia)  Continue Famotidine 20mg daily    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  BM every 1-2 days as decremented  Continue Colace 100mg BID/ Senna 2 tabs daily      Chief complaint / Reason for visit: Follow-up visit    History of Present Illness: This is a 70-year-old female patient admitted at [facility] for debility  Patient is seen and examined today to follow up acute chronic medical conditions: Vascular Dementia, Dysphagia, Hiatal Hernia/ GERD, Constipation, Hx of CVA  Patient is out of bed for this visit sitting in manual wheelchair in the common room - alert, cooperative, calm, very pleasant and not in distress  Patient is verbal with clear coherent speech - oriented to name/ birth month and day but unable to recall birth year  Patient acknowledged feeling well on this visit, " I'm alright" - patient denies any acute medical concerns during ROS assessment including pain  Per nursing, no acute medical concerns for this visit - described patient as stable and at baseline        Review of Systems:  Per history of present illness, all other systems reviewed and negative  HISTORY:  Medical Hx: Reviewed, unchanged  Family Hx: Reviewed, unchanged  Soc Hx: Reviewed,  unchanged    ALLERGY: Reviewed, unchanged  Allergies   Allergen Reactions    Lisinopril Hives     Other reaction(s): LISINOPRIL (ZESTRIL) (hives)        PHYSICAL EXAM:  Vital Signs:  T97 7F -P76 -R18 BP: 126/68 SpO2: 95% RA  Weight:  134 5 lbs (7/1/2022) <= 135 5 lbs (6/1/2022) <= 133 0 lbs (5/3/2022)    General: NAD  Frail stature  Head: Atraumatic  Normocephalic  Ear: Slightly [place]  Eye Exam: anicteric sclera, no discharge, PERRLA, No injection  Oral Exam: moist mucous membranes, no buccaloropharyngeal erythema, palatine tonsils WNL  Dentures in place  Neck Exam: no anterior cervical lymphadenopathy noted, neck supple  Cardiovascular: regular rate, irregular rhythm, no murmurs, rubs, or gallops  Pulmonary: no wheeze, no rhonchi, no rales  No chest tenderness  Abdominal: soft, non-tender, nondistended, bowel sounds audible x 4 quadrants  Ave meal completion: variable 51-75%  : Non distended bladder  Incontinent  BM every 1-2 days  Last documented BM: 7/21/2022  Extremities and skin: no edema noted, no rashes  Intact skin  Neurological: alert, cooperative and responsive, Oriented x 1, moving all 4 extremities symmetrically  Ambulatory dysfunction - wheelchair bound  Laboratory results / Imaging reviewed: Hard copy/ies in medical chart  No recent lab on file  Current Medications: All medications reviewed and updated in Nursing Home Chart    Please note:  Voice-recognition software may have been used in the preparation of this document  Occasional wrong word or "sound-alike" substitutions may have occurred due to the inherent limitations of voice recognition software  Interpretation should be guided by context      ERIK Jones  7/21/2022

## 2022-07-22 NOTE — ASSESSMENT & PLAN NOTE
Doing well with current diet modification  Weight stable  Continue LEROY, Puree texture with Nectar thick liquids

## 2022-07-22 NOTE — ASSESSMENT & PLAN NOTE
Stable  Continue LCTF supportive care  Weight stable with fair to good meal completion: 51-75%  Continue fall precaution  Continue reorientation/redirection as often as needed  Continue to monitor hydration/nutritional intake  Continue Vit B12 1000mcg daily  Continue Citalopram 10mg daily/ Melatonin 3mg daily at HS   Current code status/ Goal: Comfort measures  DNR/DNI/DNH

## 2022-09-14 ENCOUNTER — NURSING HOME VISIT (OUTPATIENT)
Dept: GERIATRICS | Facility: OTHER | Age: 87
End: 2022-09-14
Payer: COMMERCIAL

## 2022-09-14 DIAGNOSIS — N18.31 STAGE 3A CHRONIC KIDNEY DISEASE (HCC): ICD-10-CM

## 2022-09-14 DIAGNOSIS — F01.50 VASCULAR DEMENTIA WITHOUT BEHAVIORAL DISTURBANCE (HCC): Primary | ICD-10-CM

## 2022-09-14 DIAGNOSIS — I63.9 CEREBELLAR INFARCTION (HCC): ICD-10-CM

## 2022-09-14 DIAGNOSIS — I10 ESSENTIAL HYPERTENSION: ICD-10-CM

## 2022-09-14 DIAGNOSIS — K21.9 GASTROESOPHAGEAL REFLUX DISEASE WITHOUT ESOPHAGITIS: ICD-10-CM

## 2022-09-14 PROCEDURE — 99309 SBSQ NF CARE MODERATE MDM 30: CPT | Performed by: INTERNAL MEDICINE

## 2022-09-15 PROBLEM — G93.41 METABOLIC ENCEPHALOPATHY: Status: ACTIVE | Noted: 2020-12-18

## 2022-09-15 PROBLEM — R55 SYNCOPE: Status: ACTIVE | Noted: 2017-01-29

## 2022-09-15 PROBLEM — N18.9 ACUTE KIDNEY INJURY SUPERIMPOSED ON CHRONIC KIDNEY DISEASE (HCC): Status: ACTIVE | Noted: 2017-01-29

## 2022-09-15 PROBLEM — N17.9 ACUTE KIDNEY INJURY SUPERIMPOSED ON CHRONIC KIDNEY DISEASE (HCC): Status: ACTIVE | Noted: 2017-01-29

## 2022-09-15 PROBLEM — F32.9 REACTIVE DEPRESSION: Status: ACTIVE | Noted: 2021-01-18

## 2022-09-15 NOTE — PROGRESS NOTES
[facility]  [address]  [phone]  [facility]  POS 32      NAME: Casey Mayberry  AGE: 80 y o  SEX: female [phone]    DATE OF ENCOUNTER: 9/14/2022    Assessment and Plan     1  Vascular dementia without behavioral disturbance ([facility] )     2  Gastroesophageal reflux disease without esophagitis     3  Stage 3a chronic kidney disease ([facility] )     4  Essential hypertension     5  Cerebellar infarction ([facility] )        Dementia with behavioral disturbances  Pt at baseline  Good meal intake %  Pt having BM every 2-3 days  Weight gain by 2 pounds  Pt speaks but is confused  Cont long term supportive care    GERD  Stable  Cont famotidine 20 mg 1 tab po daily  Pt comfort care    CKD3  Stable  Pt comfort care    HTN  BP controlled  BP comfort care    Hx CVA  Pt awake alert  Wheelchair  Comfort care  Cont long term supportive care    Code status: DNR/DNI/DNH; comfort measures    Chief Complaint     Routine Long term follow-up visit  History of Present Illness     81 y/o F with PMH Dementia, Diastolic dysfunction, CKD3, CVA, dysphagia, HH, diastolic CHF, HTN, and vitamin B12 deficiency  The following portions of the patient's history were reviewed and updated as appropriate: allergies, current medications, past family history, past medical history, past social history, past surgical history and problem list     Review of Systems     Review of Systems   Unable to perform ROS: Dementia   All other systems reviewed and are negative        Active Problem List     Patient Active Problem List   Diagnosis    Anemia    Cerebellar infarction ([facility]    CKD (chronic kidney disease) stage 3, GFR 30-59 ml/min ([facility])    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    Dementia without behavioral disturbance ([address] )    Diastolic dysfunction    DJD (degenerative joint disease)    Dysphagia    Essential hypertension    Fall    Glucose intolerance (impaired glucose tolerance)    HH (hiatus hernia)    Hyperlipidemia    Left sided [facility]unar infarction ([facility] )    Vitamin B 12 deficiency    Ambulatory dysfunction    Axillary lump, right    Delusional disorder (HCC)    Anxiety    Gastroesophageal reflux disease without esophagitis    Generalized abdominal pain    History of CVA (cerebrovascular accident)    Acute kidney injury superimposed on chronic kidney disease (HCC)    Metabolic encephalopathy    Overweight    Reactive depression    Seborrheic keratoses    Syncope       Objective     Vitals:  138 9 Lbs 9/1/2022 11:50    Blood Pressure: 120 /  60 mmHg 9/7/2022    Temperature: 98 3 °F 9/11/2022    Pulse: 60 bpm 9/7/2022    Respirations: 18 Breaths/min 9/7/2022    Blood Sugar:      O2 Saturation: 97 0 % 9/7/2022 9/1/2022 138 9 Lbs Mechanical Lift     8/1/2022 11:09 136 2 Lbs     7/1/2022 11:51 134 5 Lbs       Physical Exam  Constitutional:       General: She is not in acute distress  Appearance: She is not ill-appearing  HENT:      Head: Normocephalic and atraumatic  Cardiovascular:      Rate and Rhythm: Normal rate and regular rhythm  Pulses: Normal pulses  Heart sounds: Murmur heard  Pulmonary:      Effort: No respiratory distress  Breath sounds: Normal breath sounds  No wheezing  Abdominal:      General: Bowel sounds are normal  There is no distension  Palpations: Abdomen is soft  Tenderness: There is no abdominal tenderness  Skin:     Comments: Legs - +1 edema ble   Neurological:      Mental Status: She is alert  Mental status is at baseline  Psychiatric:         Mood and Affect: Mood normal          Behavior: Behavior normal          Pertinent Laboratory/Diagnostic Studies:  Refer to facility chart  Current Medications   Medications reviewed and updated in facility chart      Myranda Gallagher MD  Internal Medicine  Senior Care Physician

## 2022-10-11 ENCOUNTER — NURSING HOME VISIT (OUTPATIENT)
Dept: GERIATRICS | Facility: OTHER | Age: 87
End: 2022-10-11
Payer: COMMERCIAL

## 2022-10-11 DIAGNOSIS — I10 ESSENTIAL HYPERTENSION: ICD-10-CM

## 2022-10-11 DIAGNOSIS — K21.9 GASTROESOPHAGEAL REFLUX DISEASE WITHOUT ESOPHAGITIS: ICD-10-CM

## 2022-10-11 DIAGNOSIS — I63.9 CEREBELLAR INFARCTION (HCC): ICD-10-CM

## 2022-10-11 DIAGNOSIS — N18.31 STAGE 3A CHRONIC KIDNEY DISEASE (HCC): ICD-10-CM

## 2022-10-11 DIAGNOSIS — F41.9 ANXIETY: ICD-10-CM

## 2022-10-11 DIAGNOSIS — F03.90 DEMENTIA WITHOUT BEHAVIORAL DISTURBANCE (HCC): Primary | ICD-10-CM

## 2022-10-11 PROCEDURE — 99309 SBSQ NF CARE MODERATE MDM 30: CPT | Performed by: INTERNAL MEDICINE

## 2022-10-11 NOTE — PROGRESS NOTES
[facility]  [address]  [phone]  [facility]  POS 32      NAME: Sabiha Tafoya  AGE: 80 y o  SEX: female [id number]    DATE OF ENCOUNTER: 10/11/2022    Assessment and Plan     1  Dementia without behavioral disturbance ([facility] [address] )     2  Gastroesophageal reflux disease without esophagitis     3  Stage 3a chronic kidney disease ([facility] [address] )     4  Essential hypertension     5  Cerebellar infarction ([facility] [address] )     6  Anxiety        Dementia with behavioral disturbances  Pt at baseline  Decent meal intake 51-75% & other times %  Pt having BM every 2 days  Weight loss by 2 4 pounds  Cont long term supportive care    GERD  Stable  Cont famotidine 20 mg 1 tab po daily  Pt on full liquid diet  Pt comfort care    CKD3  Stable  Pt comfort care    Anxiety  Stable  Pt on celexa 10 mg 1 tab po daily    HTN  BP controlled  BP comfort care    Hx CVA  Pt awake alert  Wheelchair  Comfort care  Cont long term supportive care    Code status: DNR/DNI/DNH; comfort measures    Chief Complaint     Routine Long term follow-up visit  History of Present Illness     81 y/o F with PMH Dementia, Diastolic dysfunction, CKD3, CVA, dysphagia, HH, diastolic CHF, HTN, anxiety and vitamin B12 deficiency  Pt seen and examined as part of long term care followup visit  Pt sitting comfortable in wheelchair  Pt trying to answer questions  Pt with dementia  Pt calm  The following portions of the patient's history were reviewed and updated as appropriate: allergies, current medications, past family history, past medical history, past social history, past surgical history and problem list     Review of Systems     Review of Systems   Unable to perform ROS: Dementia   All other systems reviewed and are negative        Active Problem List     Patient Active Problem List   Diagnosis   • Anemia   • Cerebellar infarction (HCC)   • CKD (chronic kidney disease) stage 3, GFR 30-59 ml/min (HCC)   • 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   • Dementia without behavioral disturbance (HCC)   • Diastolic dysfunction   • DJD (degenerative joint disease)   • Dysphagia   • Essential hypertension   • Fall   • Glucose intolerance (impaired glucose tolerance)   • HH (hiatus hernia)   • Hyperlipidemia   • Left sided lacunar infarction (HCC)   • Vitamin B 12 deficiency   • Ambulatory dysfunction   • Axillary lump, right   • Delusional disorder (HCC)   • Anxiety   • Gastroesophageal reflux disease without esophagitis   • Generalized abdominal pain   • History of CVA (cerebrovascular accident)   • Acute kidney injury superimposed on chronic kidney disease (HCC)   • Metabolic encephalopathy   • Overweight   • Reactive depression   • Seborrheic keratoses   • Syncope       Objective     Vitals:  136 5 Lbs 10/1/2022   Blood Pressure: 120/70   Temperature: 97 3 °F   Pulse: 20 bpm   Respirations: 68 Breaths/min   Blood Sugar:    O2 Saturation: 93 0 %         Physical Exam  Constitutional:       General: She is not in acute distress  Appearance: She is not ill-appearing  HENT:      Head: Normocephalic and atraumatic  Cardiovascular:      Rate and Rhythm: Normal rate and regular rhythm  Pulses: Normal pulses  Heart sounds: Murmur heard  Pulmonary:      Effort: No respiratory distress  Breath sounds: Normal breath sounds  No wheezing  Abdominal:      General: Bowel sounds are normal  There is no distension  Palpations: Abdomen is soft  Tenderness: There is no abdominal tenderness  Skin:     Comments: Legs - +1 edema ble   Neurological:      Mental Status: She is alert  Mental status is at baseline  Psychiatric:         Mood and Affect: Mood normal          Behavior: Behavior normal          Pertinent Laboratory/Diagnostic Studies:  Refer to facility chart  Current Medications   Medications reviewed and updated in facility chart      Christena Ganser, MD  Internal Medicine  Senior Care Physician

## 2022-11-17 ENCOUNTER — NURSING HOME VISIT (OUTPATIENT)
Dept: GERIATRICS | Facility: OTHER | Age: 87
End: 2022-11-17

## 2022-11-17 DIAGNOSIS — I51.89 DIASTOLIC DYSFUNCTION: ICD-10-CM

## 2022-11-17 DIAGNOSIS — N18.31 STAGE 3A CHRONIC KIDNEY DISEASE (HCC): ICD-10-CM

## 2022-11-17 DIAGNOSIS — F03.90 DEMENTIA WITHOUT BEHAVIORAL DISTURBANCE (HCC): Primary | ICD-10-CM

## 2022-11-17 DIAGNOSIS — I63.9 CEREBELLAR INFARCTION (HCC): ICD-10-CM

## 2022-11-17 NOTE — PROGRESS NOTES
[facility]  [address]  [phone]) [facility] SNF  POS 32      NAME: Alberto Maldonado  AGE: 80 y o  SEX: female [id number]    DATE OF ENCOUNTER: 11/17/2022    Assessment and Plan   Dementia with behavioral disturbances  Pt calm, cooperative  Avoid antideliriogenics  Decent meal intake %  Pt having BM every 2 days  Weight stable 136 5  Avoid antideliriogenics  Monitor sleep/awake cycles  Cont long term supportive care     GERD  Stable  Cont famotidine 20 mg 1 tab po daily  Pt on full liquid diet  Pt comfort care     CKD3  Stable  Pt comfort care     Anxiety  Stable  Pt on celexa 10 mg 1 tab po daily     HTN  BP controlled  132/68  BP comfort care     Hx CVA  Pt awake alert  Wheelchair  Comfort care  Cont long term supportive care     Code status: DNR/DNI/DNH; comfort measures      Chief Complaint     Routine Long term follow-up visit  History of Present Illness   81 y/o F with PMH Dementia, diastolic dysfunction, CVA, dysphagia, HTN, anxiety, CKD3, HH and vitamin B12 deficiency  Pt seen and examined as part of 60 day visit  Pt in lunchroom in her wheelchair with other patients      The following portions of the patient's history were reviewed and updated as appropriate: allergies, current medications, past family history, past medical history, past social history, past surgical history and problem list     Review of Systems     Review of Systems   Unable to perform ROS: Dementia       Active Problem List     Patient Active Problem List   Diagnosis   • Anemia   • Cerebellar infarction ([facility] [address] )   • CKD (chronic kidney disease) stage 3, GFR 30-59 ml/min (HCC)   • 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   • Dementia without behavioral disturbance (HCC)   • Diastolic dysfunction   • DJD (degenerative joint disease)   • Dysphagia   • Essential hypertension   • Fall   • Glucose intolerance (impaired glucose tolerance)   • HH (hiatus hernia)   • Hyperlipidemia   • Left sided lacunar infarction [facility])   • Vitamin B 12 deficiency   • Ambulatory dysfunction   • Axillary lump, right   • Delusional disorder [facility])   • Anxiety   • Gastroesophageal reflux disease without esophagitis   • Generalized abdominal pain   • History of CVA (cerebrovascular accident)   • Acute kidney injury superimposed on chronic kidney disease (HCC)   • Metabolic encephalopathy   • Overweight   • Reactive depression   • Seborrheic keratoses   • Syncope     Melatonin  12/9/2021       VITAMIN B-12 1000MCG TABLET Active 12/9/2021 12/9/2021     CITALOPRAM 10MG TABLET Active 12/9/2021 12/9/2021     ARTIFICIAL TEARS 1 4% SOLN Active 1/4/2022 12/9/2021     FAMOTIDINE 20MG TABLET Active 4/26/2022 4/26/2022     DIOCTO 50MG/5ML LIQUID Active 8/29/2022 8/29/2022     SENNA-TIME 8 6MG TABLET Active 4/26/2022 4/26/2022     ACETAMINOPHEN 325MG TABLET Active 4/26/2022 4/26/2022     Sorbitol Solution 70 %            Objective     Vitals:    Weight: 136 5 11/2/2022 12:20        Blood Pressure: 132 /  68 10/18/2022 22:03        Temperature: 97 9 11/6/2022 07:39        Pulse: 20 10/3/2022 01:08        Respirations: 68 10/3/2022 01:08        Blood Sugar:           O2 sats: 93 0 % 10/2/2022 22:01        Height: 61 0 3/17/2022 19:02        Pain Level: 0 11/17/2022          Physical Exam  Constitutional:       General: She is not in acute distress  Appearance: She is not ill-appearing  HENT:      Head: Normocephalic and atraumatic  Cardiovascular:      Rate and Rhythm: Normal rate and regular rhythm  Pulses: Normal pulses  Heart sounds: Normal heart sounds  Pulmonary:      Effort: Pulmonary effort is normal  No respiratory distress  Breath sounds: Normal breath sounds  Abdominal:      General: Bowel sounds are normal  There is no distension  Palpations: Abdomen is soft  Tenderness: There is no abdominal tenderness  Neurological:      Mental Status: She is alert  Mental status is at baseline  Pertinent Laboratory/Diagnostic Studies:  Refer to facility chart  Current Medications   Medications reviewed and updated in facility chart      Nikolay Abdullahi MD  Internal Medicine  Senior Care Physician

## 2023-01-01 ENCOUNTER — HOME CARE VISIT (OUTPATIENT)
Dept: HOME HEALTH SERVICES | Facility: HOME HEALTHCARE | Age: 88
End: 2023-01-01
Payer: MEDICARE

## 2023-01-01 ENCOUNTER — NURSING HOME VISIT (OUTPATIENT)
Dept: GERIATRICS | Facility: OTHER | Age: 88
End: 2023-01-01
Payer: COMMERCIAL

## 2023-01-01 ENCOUNTER — NURSING HOME VISIT (OUTPATIENT)
Dept: GERIATRICS | Facility: OTHER | Age: 88
End: 2023-01-01
Payer: MEDICARE

## 2023-01-01 ENCOUNTER — TELEPHONE (OUTPATIENT)
Dept: OTHER | Facility: OTHER | Age: 88
End: 2023-01-01

## 2023-01-01 ENCOUNTER — HOME CARE VISIT (OUTPATIENT)
Dept: HOME HOSPICE | Facility: HOSPICE | Age: 88
End: 2023-01-01
Payer: MEDICARE

## 2023-01-01 VITALS — RESPIRATION RATE: 9 BRPM | HEART RATE: 72 BPM

## 2023-01-01 VITALS — WEIGHT: 124 LBS

## 2023-01-01 DIAGNOSIS — Z51.5 HOSPICE CARE: Primary | ICD-10-CM

## 2023-01-01 DIAGNOSIS — F03.90 DEMENTIA WITHOUT BEHAVIORAL DISTURBANCE (HCC): Primary | ICD-10-CM

## 2023-01-01 DIAGNOSIS — R13.12 OROPHARYNGEAL DYSPHAGIA: ICD-10-CM

## 2023-01-01 DIAGNOSIS — G30.9 ALZHEIMER'S DISEASE, UNSPECIFIED (CODE) (HCC): ICD-10-CM

## 2023-01-01 DIAGNOSIS — I10 ESSENTIAL HYPERTENSION: ICD-10-CM

## 2023-01-01 DIAGNOSIS — Z51.5 HOSPICE CARE: ICD-10-CM

## 2023-01-01 DIAGNOSIS — F03.90 DEMENTIA WITHOUT BEHAVIORAL DISTURBANCE (HCC): ICD-10-CM

## 2023-01-01 PROCEDURE — G0156 HHCP-SVS OF AIDE,EA 15 MIN: HCPCS

## 2023-01-01 PROCEDURE — 99309 SBSQ NF CARE MODERATE MDM 30: CPT

## 2023-01-01 PROCEDURE — G0155 HHCP-SVS OF CSW,EA 15 MIN: HCPCS

## 2023-01-01 PROCEDURE — G0299 HHS/HOSPICE OF RN EA 15 MIN: HCPCS

## 2023-01-01 PROCEDURE — 99307 SBSQ NF CARE SF MDM 10: CPT | Performed by: INTERNAL MEDICINE

## 2023-01-01 RX ORDER — MORPHINE SULFATE 100 MG/5ML
5 SOLUTION, CONCENTRATE ORAL EVERY 2 HOUR PRN
Qty: 30 ML | Refills: 0 | Status: SHIPPED | OUTPATIENT
Start: 2023-01-01 | End: 2023-10-04 | Stop reason: CLARIF

## 2023-01-01 RX ORDER — LORAZEPAM 2 MG/ML
CONCENTRATE ORAL
Qty: 30 ML | Refills: 0 | Status: SHIPPED | OUTPATIENT
Start: 2023-01-01 | End: 2023-10-04 | Stop reason: CLARIF

## 2023-01-13 ENCOUNTER — NURSING HOME VISIT (OUTPATIENT)
Dept: GERIATRICS | Facility: OTHER | Age: 88
End: 2023-01-13

## 2023-01-13 DIAGNOSIS — F03.90 DEMENTIA WITHOUT BEHAVIORAL DISTURBANCE (HCC): Primary | ICD-10-CM

## 2023-01-13 DIAGNOSIS — F41.9 ANXIETY: ICD-10-CM

## 2023-01-13 DIAGNOSIS — K21.9 GASTROESOPHAGEAL REFLUX DISEASE WITHOUT ESOPHAGITIS: ICD-10-CM

## 2023-01-13 DIAGNOSIS — I10 ESSENTIAL HYPERTENSION: ICD-10-CM

## 2023-01-13 NOTE — PROGRESS NOTES
[facility]  [address]  [phone]) [facility] 6[facility] SNF  POS 32      NAME: Garrett Raya  AGE: 80 y o  SEX: female [id number]    DATE OF ENCOUNTER: 1/13/2023    Assessment and Plan     1  Dementia without behavioral disturbance ([facility] [address] )        2  Essential hypertension        3  Gastroesophageal reflux disease without esophagitis        4  Anxiety           Dementia without behavioral disturbance ([facility])  Monitor sleep wake cycle  Assist with adls, ialds  Cont 81/3 longterm care support  Avoid antidelriogenics  encoruage group activity participation    Essential hypertension  Bp   1/5/2023 22:54 116 / 60 mmHg       Controlled  Stable off meds    Gastroesophageal reflux disease without esophagitis  Stable  Cont famotidine    Anxiety  stable  Cont citalopram 10 mg 1 tab po daily         Chief Complaint     Routine Long term follow-up visit  60 day orders    History of Present Illness     Pt seen and examined as part of 60 day orders  Pt with dementia  Pt getting her toenails clipped by podiatrist  Pt able to state her name      The following portions of the patient's history were reviewed and updated as appropriate: allergies, current medications, past family history, past medical history, past social history, past surgical history and problem list     Review of Systems     Review of Systems   Unable to perform ROS: Dementia     Active Problem List     Patient Active Problem List   Diagnosis   • Anemia   • Cerebellar infarction ([facility] [address] )   • CKD (chronic kidney disease) stage 3, GFR 30-59 ml/min ([facility])   • 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   • Dementia without behavioral disturbance ([facility] [address] )   • Diastolic dysfunction   • DJD (degenerative joint disease)   • Dysphagia   • Essential hypertension   • Fall   • Glucose intolerance (impaired glucose tolerance)   • HH (hiatus hernia)   • Hyperlipidemia   • Left sided lacunar infarction [facility])   • Vitamin B 12 deficiency   • Ambulatory dysfunction   • Axillary lump, right   • Delusional disorder (HCC)   • Anxiety   • Gastroesophageal reflux disease without esophagitis   • Generalized abdominal pain   • History of CVA (cerebrovascular accident)   • Acute kidney injury superimposed on chronic kidney disease (HCC)   • Metabolic encephalopathy   • Overweight   • Reactive depression   • Seborrheic keratoses   • Syncope       Objective     Vitals: Reviewed in [employer] system  VSS    General: Awake, alert, dementia, oriented to self  Head: atraumatic, normocephalic  Cardiovascular: RRR  Lungs: Clear to auscultation bilaterally  Abdomen: nontender/nondistended, +BS  Legs: no cyanosis, clubbing or edema  Skin: clean, dry, intact  Psych: calm, cooperative    Pertinent Laboratory/Diagnostic Studies:  Refer to facility chart  Current Medications   Medications reviewed and updated in facility chart      Laura Williamson MD  Internal Medicine  Senior Care Physician

## 2023-02-16 NOTE — ASSESSMENT & PLAN NOTE
Monitor sleep wake cycle  Assist with adls, ialds  Cont 45/0 longterm care support  Avoid antidelriogenics  encoruage group activity participation

## 2023-03-07 ENCOUNTER — NURSING HOME VISIT (OUTPATIENT)
Dept: GERIATRICS | Facility: OTHER | Age: 88
End: 2023-03-07

## 2023-03-07 DIAGNOSIS — K21.9 GASTROESOPHAGEAL REFLUX DISEASE WITHOUT ESOPHAGITIS: ICD-10-CM

## 2023-03-07 DIAGNOSIS — F41.9 ANXIETY: ICD-10-CM

## 2023-03-07 DIAGNOSIS — F03.90 DEMENTIA WITHOUT BEHAVIORAL DISTURBANCE (HCC): Primary | ICD-10-CM

## 2023-03-07 DIAGNOSIS — E53.8 VITAMIN B 12 DEFICIENCY: ICD-10-CM

## 2023-03-07 NOTE — PROGRESS NOTES
[facility]  [address]  [phone]) [facility] SNF  POS 32      NAME: Jenine Goodell  AGE: 80 y o  SEX: female [id number]    DATE OF ENCOUNTER: 3/7/2023    Assessment and Plan     1  Dementia without behavioral disturbance ([facility] [address] )        2  Anxiety        3  Gastroesophageal reflux disease without esophagitis        4  Vitamin B 12 deficiency           Dementia without behavioral disturbance ([facility])  Monitor sleep wake cycle  Cont 24/7 longterm care support  Assist with adls, iadls  Avoid antideliriogenics  Encourage group activity participation  Meal intake: variable; at times %; other times; 26-50%  BM: pt often constipated; every 2-3 days BM  Weight: pt with 3 pound weight loss  Encourage good po intake      Anxiety  stable  In d/w psych, may switch from celexa to lexparo 5 mg 1 tab po daily  Await official psych recommendations  Cont care per psych & [facility]    Gastroesophageal reflux disease without esophagitis  Stable  Cont famotidine 20 mg 1 tab po daily    Vitamin B 12 deficiency  11/2022 vit b 12 858  stable  Pt on vitamin b12 1000 mcg 1 tab po daily       Code status: dnr/dni/comfort care    Chief Complaint     Routine Long term follow-up visit  60 day orders signed    History of Present Illness   Pt seen and examined as part of 60 day visit  Pt awake, alert, oriented to self only  Pt able to state her name  Pt in wheelchair in dining room      The following portions of the patient's history were reviewed and updated as appropriate: allergies, current medications, past family history, past medical history, past social history, past surgical history and problem list     Review of Systems     Review of Systems   Unable to perform ROS: Dementia     Active Problem List     Patient Active Problem List   Diagnosis   • Anemia   • Cerebellar infarction ([email or web address] )   • CKD (chronic kidney disease) stage 3, GFR 30-59 ml/min ([facility])   • 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   • Dementia without behavioral disturbance (HCC)   • Diastolic dysfunction   • DJD (degenerative joint disease)   • Dysphagia   • Essential hypertension   • Fall   • Glucose intolerance (impaired glucose tolerance)   • HH (hiatus hernia)   • Hyperlipidemia   • Left sided lacunar infarction (HCC)   • Vitamin B 12 deficiency   • Ambulatory dysfunction   • Axillary lump, right   • Delusional disorder (HCC)   • Anxiety   • Gastroesophageal reflux disease without esophagitis   • Generalized abdominal pain   • History of CVA (cerebrovascular accident)   • Acute kidney injury superimposed on chronic kidney disease (HCC)   • Metabolic encephalopathy   • Overweight   • Reactive depression   • Seborrheic keratoses   • Syncope       Objective     Vitals: Reviewed in [employer] system  VSS    General: Awake, alert, dementia  Head: atraumatic, normocephalic  Cardiovascular: RRR  Lungs: Clear to auscultation bilaterally  Abdomen: nontender/nondistended, +BS  Legs: no cyanosis, clubbing  Skin: clean, dry, intact  Psych: calm, cooperative    Pertinent Laboratory/Diagnostic Studies:  Refer to facility chart  Current Medications   Medications reviewed and updated in facility chart      Laura Williamson MD  Internal Medicine  Senior Care Physician

## 2023-03-13 NOTE — ASSESSMENT & PLAN NOTE
Monitor sleep wake cycle  Cont 24/7 longterm care support  Assist with adls, iadls  Avoid antideliriogenics  Encourage group activity participation  Meal intake: variable; at times %; other times; 26-50%  BM: pt often constipated; every 2-3 days BM  Weight: pt with 3 pound weight loss  Encourage good po intake

## 2023-03-13 NOTE — ASSESSMENT & PLAN NOTE
stable  In d/w psych, may switch from celexa to lexparo 5 mg 1 tab po daily  Await official psych recommendations  Cont care per psych & [facility]

## 2023-04-06 ENCOUNTER — NURSING HOME VISIT (OUTPATIENT)
Dept: GERIATRICS | Facility: OTHER | Age: 88
End: 2023-04-06

## 2023-04-06 DIAGNOSIS — K59.01 CONSTIPATION BY DELAYED COLONIC TRANSIT: ICD-10-CM

## 2023-04-06 DIAGNOSIS — F41.9 ANXIETY: ICD-10-CM

## 2023-04-06 DIAGNOSIS — I10 ESSENTIAL HYPERTENSION: ICD-10-CM

## 2023-04-06 DIAGNOSIS — K21.9 GASTROESOPHAGEAL REFLUX DISEASE WITHOUT ESOPHAGITIS: ICD-10-CM

## 2023-04-06 DIAGNOSIS — F03.90 DEMENTIA WITHOUT BEHAVIORAL DISTURBANCE (HCC): Primary | ICD-10-CM

## 2023-04-06 NOTE — ASSESSMENT & PLAN NOTE
Stable  Continue Lexapro and melatonin  Comfort measures, DNH,DNR, DNI  Provide redirection, reorientation, distraction techniques  Fall Precautions  Assist with ADLs/IADLs  Avoid deliriogenic medications such as tramadol, benzodiazepines, anticholinergics, benadryl  Encourage Hydration/ Nutrition  Implement sleep hygiene, limit night time interuptions   Encourage participation in group activities when appropriate

## 2023-04-06 NOTE — PROGRESS NOTES
MONTHLY VISIT  Location: [facility]   POS: 32 LTC    NAME: Duane Lerner  AGE: 80 y o  SEX: female    DATE OF ENCOUNTER: 4/6/2023    ASSESSMENT AND PROBLEMS:  1  Dementia without behavioral disturbance (HCC)  Assessment & Plan:  Stable  Continue Lexapro and melatonin  Comfort measures, DNH,DNR, DNI  Provide redirection, reorientation, distraction techniques  Fall Precautions  Assist with ADLs/IADLs  Avoid deliriogenic medications such as tramadol, benzodiazepines, anticholinergics, benadryl  Encourage Hydration/ Nutrition  Implement sleep hygiene, limit night time interuptions   Encourage participation in group activities when appropriate           2  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  Assessment & Plan:  Continue to monitor  Last BM 4/3  Continue daily lactulose, BID Senna, PRN milk of Mag, PRN suppository, PRN enema      3  Essential hypertension  Assessment & Plan:  Stable  BP trends reviewed  SBP avg 110s-130s  Diet controlled      4  Anxiety  Assessment & Plan:  Stable  Continue lexapro and supportive care  Followed by psychiatry      5  Gastroesophageal reflux disease without esophagitis  Assessment & Plan:  Stable  Continue Famotidine        CHIEF COMPLAINT:  Monthly Visit -routine 30-day visit    HISTORY OF PRESENT ILLNESS:  History taken from patient, staff, chart  Patient limited historian due to cognitive limitations    Patient also followed by [employer] for acute and chronic conditions    Duane Lerner is a 54-year-old female LTC resident of [facility] with PMH including but not limited to constipation, dysphagia, GERD, dementia, left-sided lacunar infarct, CKD, anemia, anxiety, seen today for monthly follow up  Continues with ongoing supportive care at [facility]  She was seen sitting out in chair today  Denies any complaints although a limited historian in setting of dementia, continues on Lexapro and Melatonin, also followed by psychiatry    Appetite fair/poor, eating on avg 0-50% of meals, weight remains stable with 2 6 pound gain in the last month, famotidine for GERD, continues on regular diet and ensure supplements, last BM 4/3  Continues on bowel regimen for constipation  REVIEW OF SYSTEMS:  ROS not able to be obtained due to cognitive/expressive limitations    HISTORY:  Medical Hx: Reviewed, unchanged  Family Hx: Reviewed, unchanged  Soc Hx: Reviewed, unchanged  Allergies   Allergen Reactions   • Lisinopril Hives     Other reaction(s): LISINOPRIL (ZESTRIL) (hives)       PHYSICAL EXAM:  Vital Signs: /64, temp 997 3, HR 68, RR 18, O2 93% on room air  Weight 136 6 pounds      General: NAD  Sitting in chair  Eye Exam: anicteric sclera, no discharge  ENT: moist mucous membranes  Cardiovascular: regular rate, regular rhythm, no murmurs, rubs, or gallops  Pulmonary: no wheeze, no rhonchi, unlabored, RA  Abdominal: soft, nontender, nondistended, bowel sounds audible  Neurological: Alert, oriented to self, generalized weakness/frailty  Skin: No significant lesions appreciated, no significant rashes appreciated  Psych: pleasant, confused, cooperative  PERTINENT LABS/IMAGING DATA:    11/7/2022 CBC, BMP: Hemoglobin 12 7, platelet 788, glucose 86, BUN 21, creatinine 0 66, sodium 140, potassium 4 6, EGFR 81  Magnesium 1 9, vitamin B12 858    CURRENT MEDICATIONS:  All medications reviewed and updated in Nursing Home EMR      Provider: JorgeL VALENCIA  Patient: Malissa Estrella   4/6/2023

## 2023-04-06 NOTE — ASSESSMENT & PLAN NOTE
Continue to monitor  Last BM 4/3  Continue daily lactulose, BID Senna, PRN milk of Mag, PRN suppository, PRN enema

## 2023-05-01 ENCOUNTER — NURSING HOME VISIT (OUTPATIENT)
Dept: GERIATRICS | Facility: OTHER | Age: 88
End: 2023-05-01

## 2023-05-01 DIAGNOSIS — F03.90 DEMENTIA WITHOUT BEHAVIORAL DISTURBANCE (HCC): ICD-10-CM

## 2023-05-01 DIAGNOSIS — I10 ESSENTIAL HYPERTENSION: ICD-10-CM

## 2023-05-01 DIAGNOSIS — K21.9 GASTROESOPHAGEAL REFLUX DISEASE WITHOUT ESOPHAGITIS: ICD-10-CM

## 2023-05-01 DIAGNOSIS — R13.12 OROPHARYNGEAL DYSPHAGIA: Primary | ICD-10-CM

## 2023-05-01 NOTE — ASSESSMENT & PLAN NOTE
Stable  Pt on lexapro 5 mg 1 tab po daily  Pt also on ativan 0 25 gel BID before care  Assist with adls/iadls  Cont 21/7 longterm supportive care  Monitor sleep/wake cycle  Encourage group participation

## 2023-05-01 NOTE — ASSESSMENT & PLAN NOTE
Pt on nectar thick regular diet with full liquid  Meal intake: variable; average seems 51-7% & 26-50%  BM: every other day  Weight: pt with 2 pound weight gain  136 6 Lbs 4/3/2023     3/1/2023 14:32 134 0 Lbs

## 2023-05-01 NOTE — PROGRESS NOTES
[facility]  [address]  [phone]) [facility] [address] [facility]  POS [facility]      NAME: Chris Rodriguez  AGE: 80 y o  SEX: female [id number]    DATE OF ENCOUNTER: 5/1/2023    Assessment and Plan     1  Oropharyngeal dysphagia        2  Essential hypertension        3  Gastroesophageal reflux disease without esophagitis        4  Dementia without behavioral disturbance (HCC)           Dysphagia  Pt on nectar thick regular diet with full liquid  Meal intake: variable; average seems 51-7% & 26-50%  BM: every other day  Weight: pt with 2 pound weight gain  136 6 Lbs 4/3/2023     3/1/2023 14:32 134 0 Lbs           Essential hypertension  2/18/2023 13:04 116 / 64 mmHg   Controlled off meds    Gastroesophageal reflux disease without esophagitis  Stable  Cont famotidine 20 mg 1 tab po daily    Dementia without behavioral disturbance (HCC)  Stable  Pt on lexapro 5 mg 1 tab po daily  Pt also on ativan 0 25 gel BID before care  Assist with adls/iadls  Cont 36/0 longterm supportive care  Monitor sleep/wake cycle  Encourage group participation       Code status: DNR/DNI/comfort care    Chief Complaint     Routine Long term follow-up visit  60 day orders signed    History of Present Illness   Pt seen and examined by me  Pt nods her head when I ask if her name is Brittani Beckham  (I recognize pt)  Pt oriented to self only  Pt calm & cooperative with me      The following portions of the patient's history were reviewed and updated as appropriate: allergies, current medications, past family history, past medical history, past social history, past surgical history and problem list     Review of Systems     Review of Systems   Unable to perform ROS: Dementia     Active Problem List     Patient Active Problem List   Diagnosis    Anemia    Cerebellar infarction ([facility] )    CKD (chronic kidney disease) stage 3, GFR 30-59 ml/min ([facility])    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    Dementia without behavioral disturbance ([facility] )    Diastolic dysfunction    DJD (degenerative joint disease)    Dysphagia    Essential hypertension    Fall    Glucose intolerance (impaired glucose tolerance)    HH (hiatus hernia)    Hyperlipidemia    [facility]    Vitamin B 12 deficiency    Ambulatory dysfunction    Axillary lump, right    Delusional disorder (HCC)    Anxiety    Gastroesophageal reflux disease without esophagitis    Generalized abdominal pain    History of CVA (cerebrovascular accident)    Acute kidney injury superimposed on chronic kidney disease (HCC)    Metabolic encephalopathy    Overweight    Reactive depression    Seborrheic keratoses    Syncope       Objective     Vitals: Reviewed in PointCareClick system  VSS    General: Awake, alert, dementia, oriented to self  Head: atraumatic, normocephalic  Cardiovascular: RRR  Lungs: Clear to auscultation bilaterally  Abdomen: nontender/nondistended, +BS  Legs: no cyanosis, clubbing  Skin: clean, dry, intact  Psych: calm, cooperative    Pertinent Laboratory/Diagnostic Studies:  Refer to facility chart  Current Medications   Medications reviewed and updated in facility chart      Acetaminophen Suppository 650 MG Insert 1 suppository rectally every 4 hours as needed for Mild Pain Max 3 GM APAP / 24 HR, Give Suppository If Unable to Take By Mouth AND Insert 1 suppository rectally every 4 hours as needed for Temperature = or > 100 degrees Oral / 101 degrees Rectal Max 3 GM APAP / 24 HR, Give Suppository If Unable to Take By Mouth Pharmacy Active 1/10/2021 16:55  1/10/2021   Milk of Magnesia Suspension 2400 MG/30ML Give 30 ml by mouth as needed for Constipation 1 Time a Day, If No BM on by Day # 2, Evening Nurse Gives a Laxative on Day # 2 AND Give 30 ml by mouth as needed for Constipation 1 Time a Day, If No BM on by Day # 3, Evening Nurse Gives a Laxative on Day # 3 Pharmacy Active 1/10/2021 16:56  1/10/2021   Bisac-Evac Suppository 10 MG (Bisacodyl) Insert 1 suppository rectally as needed for Constipation 1 Time a Day, If NO BM by AM Day # 4, Day Nurse Gives a Suppository that AM Pharmacy Active 1/10/2021 16:57  1/10/2021   Enema Disposable Enema (Sodium Phosphates) Insert 1 application rectally as needed for Constipation 1 Time a Day, If NO BM within 3-5 Hours of Suppository Insertion Give Enema Pharmacy Active 1/10/2021 16:58  1/10/2021   MELATONIN 3MG TABLET GIVE 1 TABLET BY MOUTH ONCE DAILY AT BEDTIME FOR INSOMNIA(Related Diagnoses: INSOMNIA, UNSPECIFIED (G47 00)) Pharmacy Active 1/10/2021 21:00  1/26/2021   VITAMIN B-12 1000MCG TABLET GIVE 1 TABLET BY MOUTH ONCE DAILY FOR SUPPLEMENT(Related Diagnoses: VITAMIN B DEFICIENCY, UNSPECIFIED (E53 9)) Pharmacy Active 1/11/2021 09:00  1/26/2021   ARTIFICIAL TEARS 1 4% SOLN INSTILL 2 DROPS INTO EACH EYE EVERY 8 HOURS AS NEEDED(Related Diagnoses: 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))(Indications for Use: /) Pharmacy Active 11/28/2021 22:00  1/4/2022   FAMOTIDINE 20MG TABLET GIVE 1 TABLET BY MOUTH ONCE DAILY FOR GASTROESOPHAGEAL REFLUX DISEASE(Related Diagnoses: GASTRO-ESOPHAGEAL REFLUX DISEASE WITHOUT ESOPHAGITIS (K21 9))(Indications for Use: GERD) Pharmacy Active 1/12/2022 06:00  1/12/2022   ACETAMINOPHEN 325MG TABLET GIVE 2 TABLETS (650MG) BY MOUTH THREE TIMES DAILY FOR LEFT KNEE PAIN(Related Diagnoses: PAIN, UNSPECIFIED (R52))(Indications for Use: /) Pharmacy Active 3/7/2022 17:00  3/9/2022   SENNA 8 6MG TABLET GIVE 2 TABLETS (17 2MG) BY MOUTH TWICE DAILY IN THE MORNING AND AT BEDTIME FOR CONSTIPATION(Indications for Use: /) Pharmacy Active 11/29/2022 09:00  11/28/2022   LACTULOSE 10GM/15ML SOLN GIVE 15ML (10GM) BY MOUTH EVERY MORNING FOR CONSTIPATION *HOLD FOR LOOSE STOOLS*(Related Diagnoses: SLOW TRANSIT CONSTIPATION (K59 01)) Pharmacy Active 1/19/2023 09:00  1/18/2023   ESCITALOPRAM 5MG TABLET GIVE 1 TABLET BY MOUTH ONCE DAILY FOR MAJOR DEPRESSIVE DISORDER(Indications for Use: MDD)(Additional Directions: ESCITALOPRAM 5MG TABLET GIVE 1 TABLET BY MOUTH ONCE DAILY FOR MAJOR DEPRESSIVE DISORDER) Pharmacy Active 3/8/2023 09:00  3/7/2023   LORAZEPAM 0 25MG/0 5ML GEL APPLY 0 5ML (0 25MG) TOPICALLY TWICE DAILY BEFORE MORNING AND EVENING CARE FOR AGITATION, HITTING, YELLING WITH CARE ( HOLD IF DROWSY) Pharmacy             Lesley Abarca MD  Internal Medicine  Senior Care Physician

## 2023-05-01 NOTE — ASSESSMENT & PLAN NOTE
Patient is independent , alert and oriented, clear liquid diet, passing gas, CIWAR scored zero, prn oxycodone for pain management.                       Stable  Cont famotidine 20 mg 1 tab po daily

## 2023-05-22 ENCOUNTER — NURSING HOME VISIT (OUTPATIENT)
Dept: GERIATRICS | Facility: OTHER | Age: 88
End: 2023-05-22

## 2023-05-22 DIAGNOSIS — E78.2 MIXED HYPERLIPIDEMIA: ICD-10-CM

## 2023-05-22 DIAGNOSIS — I10 ESSENTIAL HYPERTENSION: Primary | ICD-10-CM

## 2023-05-22 DIAGNOSIS — K21.9 GASTROESOPHAGEAL REFLUX DISEASE WITHOUT ESOPHAGITIS: ICD-10-CM
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
## 2023-05-22 NOTE — PROGRESS NOTES
"[facility]  [address]  [phone]  [facility]  POS 32      NAME: Leslie Gallagher  AGE: 80 y o  SEX: female [phone]    DATE OF ENCOUNTER: 5/22/2023    Assessment and Plan     1  Essential hypertension        2  Fall, subsequent encounter        3  Mixed hyperlipidemia        4  Gastroesophageal reflux disease without esophagitis           Gastroesophageal reflux disease without esophagitis  Stable  Cont famotidine    Fall  Pt had fallen on 5/21/2023  Per pointcare click:  3/44/396 44:12 Nurses Note   Note Text: Unwitnessed fall, CNA called this nurse and said: \"Resident is on the floor\"  I found resident sitting on the floor with her knees bent and feet under the bedside table also her back against her bed  Resident did not have her non-skid socks on  No s/s of pain, skin check intact, no bruising no injury noted  Resident unable to describe what happened  Resident assessed, skin intact, full range of motion to all extremities at resident baseline  Neuro checks initiated and WNL, vitals: [email or web address]  Resident transferred from floor to bed with gait belt assist by 2 staff members  Resident toileted  Nurse supervisor made aware  New order for bed alarm in place to prevent future falls  IRF sent to therapy  MD and family will be notified in the morning  Hyperlipidemia  Stable  Cont statin    Essential hypertension  5/22/2023 14:49 118 / 65 mmHg   Stable BP off meds  BP checked more often as pt had fallen  Pt appears medically stable       Code status: DNR/DNI/DNH    Chief Complaint     Acute visit  History of Present Illness   Notified by RN pt had slide out of bed  See pcc note  Pt seen in lunchroom  Pt appears medically stable and at baseline  Pt oriented to self      The following portions of the patient's history were reviewed and updated as appropriate: allergies, current medications, past family history, past medical history, past " social history, past surgical history and problem list     Review of Systems     Review of Systems   Unable to perform ROS: Dementia     Active Problem List     Patient Active Problem List   Diagnosis   • Anemia   • Cerebellar infarction ([facility] )   • CKD (chronic kidney disease) stage 3, GFR 30-59 ml/min ([facility])   • 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   • Dementia without behavioral disturbance ([facility])   • Diastolic dysfunction   • DJD (degenerative joint disease)   • Dysphagia   • Essential hypertension   • Fall   • Glucose intolerance (impaired glucose tolerance)   • HH (hiatus hernia)   • Hyperlipidemia   • Left sided lacunar infarction ([facility] )   • Vitamin B 12 deficiency   • Ambulatory dysfunction   • Axillary lump, right   • Delusional disorder ([facility])   • Anxiety   • Gastroesophageal reflux disease without esophagitis   • Generalized abdominal pain   • History of CVA (cerebrovascular accident)   • Acute kidney injury superimposed on chronic kidney disease ([facility] )   • Metabolic encephalopathy   • Overweight   • Reactive depression   • Seborrheic keratoses   • Syncope       Objective     Vitals: Reviewed in PointCareClick system  VSS    General: Awake, alert, oriented to self; pt with dementia  Head: atraumatic, normocephalic  Cardiovascular: RRR  Lungs: Clear to auscultation bilaterally  Abdomen: nontender/nondistended, +BS  Legs: no cyanosis, clubbing or edema  Skin: clean, dry, intact  Psych: calm, cooperative    Pertinent Laboratory/Diagnostic Studies:  Refer to facility chart  Current Medications   Medications reviewed and updated in facility chart   Total time spent: 15 minutes    Vivi Gatica MD  Internal Medicine  Senior Care Physician

## 2023-05-28 NOTE — ASSESSMENT & PLAN NOTE
5/22/2023 14:49 118 / 65 mmHg   Stable BP off meds  BP checked more often as pt had fallen  Pt appears medically stable

## 2023-05-28 NOTE — ASSESSMENT & PLAN NOTE
"Pt had fallen on 5/21/2023  Per pointcare click:  0/23/8192 67:32 Nurses Note   Note Text: Unwitnessed fall, CNA called this nurse and said: \"Resident is on the floor\"  I found resident sitting on the floor with her knees bent and feet under the bedside table also her back against her bed  Resident did not have her non-skid socks on  No s/s of pain, skin check intact, no bruising no injury noted  Resident unable to describe what happened  Resident assessed, skin intact, full range of motion to all extremities at resident baseline  Neuro checks initiated and WNL, vitals: [email or web address]  Resident transferred from floor to bed with gait belt assist by 2 staff members  Resident toileted  Nurse supervisor made aware  New order for bed alarm in place to prevent future falls  IRF sent to therapy  MD and family will be notified in the morning         "

## 2023-06-05 ENCOUNTER — NURSING HOME VISIT (OUTPATIENT)
Dept: GERIATRICS | Facility: OTHER | Age: 88
End: 2023-06-05
Payer: COMMERCIAL

## 2023-06-05 DIAGNOSIS — F41.9 ANXIETY: ICD-10-CM

## 2023-06-05 DIAGNOSIS — K21.9 GASTROESOPHAGEAL REFLUX DISEASE WITHOUT ESOPHAGITIS: ICD-10-CM

## 2023-06-05 DIAGNOSIS — I10 ESSENTIAL HYPERTENSION: ICD-10-CM

## 2023-06-05 DIAGNOSIS — M25.561 ACUTE PAIN OF BOTH KNEES: ICD-10-CM

## 2023-06-05 DIAGNOSIS — R13.12 OROPHARYNGEAL DYSPHAGIA: ICD-10-CM

## 2023-06-05 DIAGNOSIS — K59.01 CONSTIPATION BY DELAYED COLONIC TRANSIT: ICD-10-CM

## 2023-06-05 DIAGNOSIS — F03.90 DEMENTIA WITHOUT BEHAVIORAL DISTURBANCE (HCC): Primary | ICD-10-CM

## 2023-06-05 DIAGNOSIS — M25.562 ACUTE PAIN OF BOTH KNEES: ICD-10-CM

## 2023-06-05 PROCEDURE — 99309 SBSQ NF CARE MODERATE MDM 30: CPT

## 2023-06-05 NOTE — PROGRESS NOTES
MONTHLY VISIT  Location: [facility]   POS: 32 LTC    NAME: Remberto Miles  AGE: 80 y o  SEX: female    DATE OF ENCOUNTER: 6/5/2023    ASSESSMENT AND PROBLEMS:  1  Dementia without behavioral disturbance (HCC)  Assessment & Plan:  Stable  Continue Lexapro, Lorazepam, and melatonin  Comfort measures, DNH,DNR, DNI  Provide redirection, reorientation, distraction techniques  Fall Precautions  Assist with ADLs/IADLs  Avoid deliriogenic medications such as tramadol, benzodiazepines, anticholinergics, benadryl  Encourage Hydration/ Nutrition  Implement sleep hygiene, limit night time interuptions   Encourage participation in group activities when appropriate       2  Oropharyngeal dysphagia  Assessment & Plan:  Stable   Requires feeding assistance  Noted 4 5 lb weight loss this past month  Continue regular diet full liquid texture with nectar thick liquids and ensure  Also followed by RD      3  Acute pain of both knees  Assessment & Plan:  L > R  Check B/L knee xrays  Continue PRN Tylenol       4  Gastroesophageal reflux disease without esophagitis  Assessment & Plan:  Stable  Continue famotidine      5  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  Assessment & Plan:  Stable  Continue daily lactulose, BID Senna, PRN milk of Mag, PRN suppository, PRN enema         6  Essential hypertension  Assessment & Plan:  Stable  BP trends reviewed  SBP avg 110s-130s  Diet controlled         7   Anxiety  Assessment & Plan:  Stable  Continue lexapro and supportive care  Followed by psychiatry, last seen 5/10 with recommendations to continue current medications                   CHIEF COMPLAINT:  Monthly Visit -routine 30-day visit    HISTORY OF PRESENT ILLNESS:  History taken from patient, staff, chart  Patient limited historian due to cognitive limitations    Patient also followed by [facility] for acute and chronic conditions    Remberto Miles is a 25-year-old female LTC resident of [facility] with PMH including but not limited to constipation, dysphagia, GERD, dementia, left-sided lacunar infarct, CKD, anemia, anxiety, seen today for monthly follow up  Continues with ongoing supportive care at [address] [facility]  She was seen sitting out in chair today preparing to eat lunch  Staff report her appetite fluctuates, requires assistance for eating  Although patient is a limited historian in setting of dementia, she reports knee pain  She originally motioned to her right knee but then reported that her left knee was bothersome  She continues on Lexapro, lorazepam, and Melatonin for dementia, behaviors overall stable  LTC chart reviewed, last BM 6/4, appetite overall fair, noted 4 pound weight loss this past month  Continues on modified diet and Ensure  REVIEW OF SYSTEMS:  ROS not able to be obtained due to cognitive/expressive limitations    HISTORY:  Medical Hx: Reviewed, unchanged  Family Hx: Reviewed, unchanged  Soc Hx: Reviewed, unchanged  Allergies   Allergen Reactions   • Lisinopril Hives     Other reaction(s): LISINOPRIL (ZESTRIL) (hives)       PHYSICAL EXAM:  Vital Signs: /76, temp 98 3, HR 76, RR 18, O2 93%  Weight 129 pounds      General: NAD  Sitting in chair  Eye Exam: anicteric sclera, no discharge  ENT: moist mucous membranes  Cardiovascular: regular rate, regular rhythm, no murmurs, rubs, or gallops  Pulmonary: no wheeze, no rhonchi, unlabored, RA  Abdominal: soft, nontender, nondistended, bowel sounds audible  Neurological: Alert, oriented to self, generalized weakness/frailty  Musc: Tenderness to left knee, trace swelling, no redness or warmth   Skin: No significant lesions appreciated, no significant rashes appreciated  Psych: pleasant, confused, cooperative      PERTINENT LABS/IMAGING DATA:    11/7/2022 CBC, BMP: Hemoglobin 12 7, platelet 687, glucose 86, BUN 21, creatinine 0 66, sodium 140, potassium 4 6, EGFR 81  Magnesium 1 9, vitamin B12 858    CURRENT MEDICATIONS:  All medications reviewed and updated in Nursing Home EMR      Provider: Katelynn VALENCIA  Patient: Darrian Carlos   6/5/2023

## 2023-06-05 NOTE — ASSESSMENT & PLAN NOTE
Stable  Continue Lexapro, Lorazepam, and melatonin  Comfort measures, DNH,DNR, DNI  Provide redirection, reorientation, distraction techniques  Fall Precautions  Assist with ADLs/IADLs  Avoid deliriogenic medications such as tramadol, benzodiazepines, anticholinergics, benadryl  Encourage Hydration/ Nutrition  Implement sleep hygiene, limit night time interuptions   Encourage participation in group activities when appropriate

## 2023-06-05 NOTE — ASSESSMENT & PLAN NOTE
Stable   Requires feeding assistance  Noted 4 5 lb weight loss this past month  Continue regular diet full liquid texture with nectar thick liquids and ensure  Also followed by [facility]

## 2023-06-05 NOTE — ASSESSMENT & PLAN NOTE
Stable  Continue lexapro and supportive care  Followed by psychiatry, last seen 5/10 with recommendations to continue current medications

## 2023-06-19 ENCOUNTER — HOME CARE VISIT (OUTPATIENT)
Dept: HOME HEALTH SERVICES | Facility: HOME HEALTHCARE | Age: 88
End: 2023-06-19
Payer: MEDICARE

## 2023-06-19 ENCOUNTER — HOSPICE ADMISSION (OUTPATIENT)
Dept: HOME HOSPICE | Facility: HOSPICE | Age: 88
End: 2023-06-19
Payer: MEDICARE

## 2023-06-19 NOTE — CASE COMMUNICATION
For RLOC  Karley Vega  [phone] yo resident of [facility]  Referred for ES Dementia and wt loss  Chart states Alzheimer's dementia and vascular dementia  Dysphagia, GERD, Wt loss, Hx CVA CKD stage 3, Metabolic encephalopathy  Wt in April was 136 6  129 lbs  4 of those pounds lost in the past month  Totally dependent for all ADLs,  Mechanical lift from bed to broda chair  Full liquid diet and must be fed  Inconti nent of  bowel and bladder  Becomes agitated with care, often yells out and can not be redirected  PPS 30 FAST 7 C  Approve RLOC?     Approved RLOC 6 19 23 Dx Dementia with Wt loss

## 2023-06-20 ENCOUNTER — HOME CARE VISIT (OUTPATIENT)
Dept: HOME HEALTH SERVICES | Facility: HOME HEALTHCARE | Age: 88
End: 2023-06-20
Payer: MEDICARE

## 2023-06-20 ENCOUNTER — HOME CARE VISIT (OUTPATIENT)
Dept: HOME HOSPICE | Facility: HOSPICE | Age: 88
End: 2023-06-20
Payer: MEDICARE

## 2023-06-20 VITALS — DIASTOLIC BLOOD PRESSURE: 64 MMHG | SYSTOLIC BLOOD PRESSURE: 120 MMHG | HEART RATE: 68 BPM | RESPIRATION RATE: 18 BRPM

## 2023-06-20 DIAGNOSIS — M17.12 OSTEOARTHRITIS OF LEFT KNEE, UNSPECIFIED OSTEOARTHRITIS TYPE: ICD-10-CM

## 2023-06-20 DIAGNOSIS — M25.562 ACUTE PAIN OF BOTH KNEES: Primary | ICD-10-CM

## 2023-06-20 DIAGNOSIS — M25.561 ACUTE PAIN OF BOTH KNEES: Primary | ICD-10-CM

## 2023-06-20 DIAGNOSIS — F41.9 ANXIETY: ICD-10-CM

## 2023-06-20 PROCEDURE — G0299 HHS/HOSPICE OF RN EA 15 MIN: HCPCS

## 2023-06-20 PROCEDURE — G0155 HHCP-SVS OF CSW,EA 15 MIN: HCPCS

## 2023-06-20 RX ORDER — OXYCODONE HYDROCHLORIDE 10 MG/1
5 TABLET ORAL EVERY 4 HOURS PRN
Qty: 14 TABLET | Refills: 0 | Status: SHIPPED | OUTPATIENT
Start: 2023-06-20

## 2023-06-20 RX ORDER — LORAZEPAM 2 MG/ML
0.5 CONCENTRATE ORAL EVERY 6 HOURS PRN
Qty: 30 ML | Refills: 0 | Status: SHIPPED | OUTPATIENT
Start: 2023-06-20

## 2023-06-20 RX ORDER — OXYCODONE HYDROCHLORIDE 5 MG/1
2.5 TABLET ORAL EVERY 4 HOURS PRN
Qty: 14 TABLET | Refills: 0 | Status: SHIPPED | OUTPATIENT
Start: 2023-06-20

## 2023-06-20 RX ORDER — LORAZEPAM 0.5 MG/1
0.25 TABLET ORAL 2 TIMES DAILY
Qty: 30 TABLET | Refills: 0 | Status: SHIPPED | OUTPATIENT
Start: 2023-06-20

## 2023-06-21 ENCOUNTER — HOME CARE VISIT (OUTPATIENT)
Dept: HOME HOSPICE | Facility: HOSPICE | Age: 88
End: 2023-06-21
Payer: MEDICARE

## 2023-06-21 PROCEDURE — G0155 HHCP-SVS OF CSW,EA 15 MIN: HCPCS

## 2023-06-22 ENCOUNTER — HOME CARE VISIT (OUTPATIENT)
Dept: HOME HEALTH SERVICES | Facility: HOME HEALTHCARE | Age: 88
End: 2023-06-22
Payer: MEDICARE

## 2023-06-22 PROCEDURE — G0299 HHS/HOSPICE OF RN EA 15 MIN: HCPCS

## 2023-06-23 ENCOUNTER — HOME CARE VISIT (OUTPATIENT)
Dept: HOME HEALTH SERVICES | Facility: HOME HEALTHCARE | Age: 88
End: 2023-06-23
Payer: MEDICARE

## 2023-06-23 ENCOUNTER — HOME CARE VISIT (OUTPATIENT)
Dept: HOME HOSPICE | Facility: HOSPICE | Age: 88
End: 2023-06-23
Payer: MEDICARE

## 2023-06-23 PROCEDURE — G0156 HHCP-SVS OF AIDE,EA 15 MIN: HCPCS

## 2023-06-25 ENCOUNTER — HOME CARE VISIT (OUTPATIENT)
Dept: HOME HEALTH SERVICES | Facility: HOME HEALTHCARE | Age: 88
End: 2023-06-25
Payer: MEDICARE

## 2023-06-25 PROCEDURE — G0299 HHS/HOSPICE OF RN EA 15 MIN: HCPCS

## 2023-06-26 ENCOUNTER — HOME CARE VISIT (OUTPATIENT)
Dept: HOME HEALTH SERVICES | Facility: HOME HEALTHCARE | Age: 88
End: 2023-06-26
Payer: MEDICARE

## 2023-06-26 ENCOUNTER — HOME CARE VISIT (OUTPATIENT)
Dept: HOME HOSPICE | Facility: HOSPICE | Age: 88
End: 2023-06-26
Payer: MEDICARE

## 2023-06-26 PROCEDURE — G0155 HHCP-SVS OF CSW,EA 15 MIN: HCPCS

## 2023-06-26 PROCEDURE — G0156 HHCP-SVS OF AIDE,EA 15 MIN: HCPCS

## 2023-06-27 ENCOUNTER — NURSING HOME VISIT (OUTPATIENT)
Dept: GERIATRICS | Facility: OTHER | Age: 88
End: 2023-06-27
Payer: COMMERCIAL

## 2023-06-27 DIAGNOSIS — I10 ESSENTIAL HYPERTENSION: ICD-10-CM

## 2023-06-27 DIAGNOSIS — F41.9 ANXIETY: ICD-10-CM

## 2023-06-27 DIAGNOSIS — K21.9 GASTROESOPHAGEAL REFLUX DISEASE WITHOUT ESOPHAGITIS: ICD-10-CM

## 2023-06-27 DIAGNOSIS — F03.90 DEMENTIA WITHOUT BEHAVIORAL DISTURBANCE (HCC): Primary | ICD-10-CM

## 2023-06-27 PROCEDURE — 99309 SBSQ NF CARE MODERATE MDM 30: CPT | Performed by: INTERNAL MEDICINE

## 2023-06-27 NOTE — PROGRESS NOTES
[address]  [phone]) [address] [facility]  POS 32      NAME: Herminia Haskins  AGE: 80 y.o. SEX: female [phone]    DATE OF ENCOUNTER: 6/27/2023    Assessment and Plan     1. Dementia without behavioral disturbance ([address] [facility])        2. Gastroesophageal reflux disease without esophagitis        3. Essential hypertension        4. Anxiety           Dementia without behavioral disturbance ([facility])  Pt awake, alert smiling  Monitor sleep wake cycle  Avoid deliriogenic agents  Do not agitate patient  Redirect, reorient and provide distraction techniques  Assist with adls/iadls  Cont 22/1 longterm supportive care    Gastroesophageal reflux disease without esophagitis  Stable  Cont famotidine 20 mg 1 tab po daily    Essential hypertension  161 / 74 mmHg 6/14/2023 10:09    5/25/2023 01:58 128 / 76 mmHg    5/24/2023 18:13 142 / 63 mmHg    5/24/2023 10:03 122 / 66 mmHg    BP stable off meds  Cont care per hospice      Anxiety  Stable  Hospice recommends switching from lexapro to celexa  Stop lexapro  Cont celexa 20 mg 1 tab po daily  Cont care per hospice       Code status: DNR/DNI/comfort care/hospice    Chief Complaint     Long term follow-up visit. 60 day orders signed    History of Present Illness   Asked by RN to see pt as part of 60 day visit. Pt sitting in lunchroom with other residents. Pt smiling and oriented to her own name.     The following portions of the patient's history were reviewed and updated as appropriate: allergies, current medications, past family history, past medical history, past social history, past surgical history and problem list.    Review of Systems     Review of Systems   Unable to perform ROS: Dementia     Active Problem List     Patient Active Problem List   Diagnosis   • Anemia   • Cerebellar infarction ([address] [facility])   • CKD (chronic kidney disease) stage 3, GFR 30-59 ml/min ([facility])   • 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   • Dementia without behavioral disturbance (HCC)   • Diastolic dysfunction   • DJD (degenerative joint disease)   • Dysphagia   • Essential hypertension   • Fall   • Glucose intolerance (impaired glucose tolerance)   • HH (hiatus hernia)   • Hyperlipidemia   • Left sided lacunar infarction (HCC)   • Vitamin B 12 deficiency   • Ambulatory dysfunction   • Axillary lump, right   • Delusional disorder (HCC)   • Anxiety   • Gastroesophageal reflux disease without esophagitis   • Generalized abdominal pain   • History of CVA (cerebrovascular accident)   • Acute kidney injury superimposed on chronic kidney disease (HCC)   • Metabolic encephalopathy   • Overweight   • Reactive depression   • Seborrheic keratoses   • Syncope   • Acute pain of both knees       Objective     Vitals: Reviewed in PointCareClick system. VSS    General: Awake, alert, dementia  Head: atraumatic, normocephalic  Cardiovascular: RRR  Lungs: Clear to auscultation bilaterally  Abdomen: nontender/nondistended, +BS  Legs: no cyanosis, clubbing or edema  Skin: clean, dry, intact  Psych: calm, cooperative    Pertinent Laboratory/Diagnostic Studies:  Refer to facility chart. Current Medications   Medications reviewed and updated in facility chart.     Johana Cuba MD  Internal Medicine  Senior Care Physician

## 2023-06-28 ENCOUNTER — HOME CARE VISIT (OUTPATIENT)
Dept: HOME HEALTH SERVICES | Facility: HOME HEALTHCARE | Age: 88
End: 2023-06-28
Payer: MEDICARE

## 2023-06-28 PROCEDURE — G0156 HHCP-SVS OF AIDE,EA 15 MIN: HCPCS

## 2023-07-02 NOTE — ASSESSMENT & PLAN NOTE
Pt awake, alert smiling  Monitor sleep wake cycle  Avoid deliriogenic agents  Do not agitate patient  Redirect, reorient and provide distraction techniques  Assist with adls/iadls  Cont 46/3 longterm supportive care

## 2023-07-02 NOTE — ASSESSMENT & PLAN NOTE
161 / 74 mmHg 6/14/2023 10:09    5/25/2023 01:58 128 / 76 mmHg    5/24/2023 18:13 142 / 63 mmHg    5/24/2023 10:03 122 / 66 mmHg    BP stable off meds  Cont care per hospice

## 2023-07-02 NOTE — ASSESSMENT & PLAN NOTE
Stable  Hospice recommends switching from lexapro to celexa  Stop lexapro  Cont celexa 20 mg 1 tab po daily  Cont care per hospice

## 2023-07-03 ENCOUNTER — HOME CARE VISIT (OUTPATIENT)
Dept: HOME HEALTH SERVICES | Facility: HOME HEALTHCARE | Age: 88
End: 2023-07-03
Payer: MEDICARE

## 2023-07-03 VITALS — HEART RATE: 70 BPM | SYSTOLIC BLOOD PRESSURE: 122 MMHG | RESPIRATION RATE: 18 BRPM | DIASTOLIC BLOOD PRESSURE: 60 MMHG

## 2023-07-03 PROCEDURE — G0299 HHS/HOSPICE OF RN EA 15 MIN: HCPCS

## 2023-07-05 ENCOUNTER — HOME CARE VISIT (OUTPATIENT)
Dept: HOME HEALTH SERVICES | Facility: HOME HEALTHCARE | Age: 88
End: 2023-07-05
Payer: MEDICARE

## 2023-07-05 PROCEDURE — G0156 HHCP-SVS OF AIDE,EA 15 MIN: HCPCS

## 2023-07-07 ENCOUNTER — HOME CARE VISIT (OUTPATIENT)
Dept: HOME HEALTH SERVICES | Facility: HOME HEALTHCARE | Age: 88
End: 2023-07-07
Payer: MEDICARE

## 2023-07-07 PROCEDURE — G0156 HHCP-SVS OF AIDE,EA 15 MIN: HCPCS

## 2023-07-10 ENCOUNTER — HOME CARE VISIT (OUTPATIENT)
Dept: HOME HOSPICE | Facility: HOSPICE | Age: 88
End: 2023-07-10
Payer: MEDICARE

## 2023-07-10 PROCEDURE — G0155 HHCP-SVS OF CSW,EA 15 MIN: HCPCS

## 2023-07-11 ENCOUNTER — HOME CARE VISIT (OUTPATIENT)
Dept: HOME HEALTH SERVICES | Facility: HOME HEALTHCARE | Age: 88
End: 2023-07-11
Payer: MEDICARE

## 2023-07-11 PROCEDURE — G0156 HHCP-SVS OF AIDE,EA 15 MIN: HCPCS

## 2023-07-12 ENCOUNTER — HOME CARE VISIT (OUTPATIENT)
Dept: HOME HOSPICE | Facility: HOSPICE | Age: 88
End: 2023-07-12
Payer: MEDICARE

## 2023-07-12 ENCOUNTER — HOME CARE VISIT (OUTPATIENT)
Dept: HOME HEALTH SERVICES | Facility: HOME HEALTHCARE | Age: 88
End: 2023-07-12
Payer: MEDICARE

## 2023-07-12 PROCEDURE — G0156 HHCP-SVS OF AIDE,EA 15 MIN: HCPCS

## 2023-07-14 ENCOUNTER — HOME CARE VISIT (OUTPATIENT)
Dept: HOME HEALTH SERVICES | Facility: HOME HEALTHCARE | Age: 88
End: 2023-07-14
Payer: MEDICARE

## 2023-07-14 VITALS
OXYGEN SATURATION: 98 % | HEART RATE: 63 BPM | TEMPERATURE: 97.1 F | SYSTOLIC BLOOD PRESSURE: 128 MMHG | RESPIRATION RATE: 16 BRPM | DIASTOLIC BLOOD PRESSURE: 76 MMHG

## 2023-07-14 PROCEDURE — G0299 HHS/HOSPICE OF RN EA 15 MIN: HCPCS

## 2023-07-14 PROCEDURE — G0156 HHCP-SVS OF AIDE,EA 15 MIN: HCPCS

## 2023-07-16 ENCOUNTER — HOME CARE VISIT (OUTPATIENT)
Dept: HOME HEALTH SERVICES | Facility: HOME HEALTHCARE | Age: 88
End: 2023-07-16
Payer: MEDICARE

## 2023-07-16 ENCOUNTER — HOME CARE VISIT (OUTPATIENT)
Dept: HOME HEALTH SERVICES | Facility: HOME HEALTHCARE | Age: 88
End: 2023-07-16

## 2023-07-16 PROCEDURE — G0156 HHCP-SVS OF AIDE,EA 15 MIN: HCPCS

## 2023-07-16 PROCEDURE — G0299 HHS/HOSPICE OF RN EA 15 MIN: HCPCS

## 2023-07-19 ENCOUNTER — HOME CARE VISIT (OUTPATIENT)
Dept: HOME HEALTH SERVICES | Facility: HOME HEALTHCARE | Age: 88
End: 2023-07-19
Payer: MEDICARE

## 2023-07-19 DIAGNOSIS — F41.9 ANXIETY: ICD-10-CM

## 2023-07-19 PROCEDURE — G0156 HHCP-SVS OF AIDE,EA 15 MIN: HCPCS

## 2023-07-19 RX ORDER — LORAZEPAM 0.5 MG/1
0.25 TABLET ORAL 2 TIMES DAILY
Qty: 30 TABLET | Refills: 0 | Status: SHIPPED | OUTPATIENT
Start: 2023-07-19

## 2023-07-21 ENCOUNTER — HOME CARE VISIT (OUTPATIENT)
Dept: HOME HEALTH SERVICES | Facility: HOME HEALTHCARE | Age: 88
End: 2023-07-21
Payer: MEDICARE

## 2023-07-21 PROCEDURE — G0156 HHCP-SVS OF AIDE,EA 15 MIN: HCPCS

## 2023-07-22 ENCOUNTER — TELEPHONE (OUTPATIENT)
Dept: OTHER | Facility: OTHER | Age: 88
End: 2023-07-22

## 2023-07-23 PROBLEM — Z51.5 HOSPICE CARE: Status: ACTIVE | Noted: 2023-07-23

## 2023-07-23 NOTE — ASSESSMENT & PLAN NOTE
[address] at [address] [facility] scheduled and PRN lorazepam  Continue PRN oxycodone

## 2023-07-23 NOTE — PROGRESS NOTES
MONTHLY VISIT  [facility]: [facility]   POS: 32 LTC    NAME: Diamante Bar  AGE: 80 y.o. SEX: female    DATE OF ENCOUNTER: 7/24/2023    ASSESSMENT AND PROBLEMS:  1. Dementia without behavioral disturbance [facility])  Assessment & Plan:  Mood and behavior stable  Now on hospice care  Continue Citalopram, Lorazepam  Provide redirection, reorientation, distraction techniques  Fall Precautions  Assist with ADLs/IADLs  Encourage Hydration/ Nutrition  Implement sleep hygiene, limit night time interuptions   Encourage participation in group activities when appropriate       2. Hospice care  Assessment & Plan:  [address] at LT    Continue scheduled and PRN lorazepam  Continue PRN oxycodone       3. Fall, initial encounter  Assessment & Plan:  Unwitnessed on 7/22  Small bruise to left knee  Continue neuro checks and vital signs  Maintain fall precautions - continue bed alarm, low bed, fall mats       4. 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  Assessment & Plan:  Intermittent  Increase lactulose to BID  Continue Senna, and PRNs- follow bowel regimen protocol       5. Gastroesophageal reflux disease without esophagitis  Assessment & Plan:  Continue famotidine       6. Anxiety  Assessment & Plan:  Continue citalopram, lorazepam, and supportive care  Followed by psychiatry          7. Oropharyngeal dysphagia  Assessment & Plan:  Regular diet, Full Liquid texture, Nectar consistency  Assist with feeds              CHIEF COMPLAINT:  Monthly Visit     HISTORY OF PRESENT ILLNESS:  History taken from patient, staff, chart  Patient limited historian due to cognitive limitations    Patient also followed by [facility] for acute and chronic conditions    Diamante Bar is a 57-year-old [facility] PMH including but not limited to constipation, dysphagia, GERD, dementia, left-sided lacunar infarct, CKD, anemia, anxiety, seen today for monthly follow up as well as f/u for fall .     Continues with ongoing supportive care at [address] [place] [address]. Continues on hospice care. She sustained fall at night on 7/22, per nursing notes: "Resident FOF on R side of the bed sitting on her buttocks with the bed alarm sounding. Resident was assessed, 3nam8es bruise to L knee noted, ROM to all extremities noted, no c/o pain or discomfort noted. Resident was unable to give an explanation as to what happened. Resident was assisted from the floor and back to bed Ax2 via theo lift. Neuro checks started and WNL"    Although limited historian, she offers no complaints on today's visit, seen sitting out in chair in dining area on today's visit. Mood stable at time of visit. LT chart reviewed, Appetite greatly fluctuates eating anywhere from 0 to 100% of meals, last BM 7/20, weight remains stable since last month. REVIEW OF SYSTEMS:  ROS not able to be obtained due to cognitive/expressive limitations    HISTORY:  Medical Hx: Reviewed, unchanged  Family Hx: Reviewed, unchanged  Soc Hx: Reviewed, unchanged  Allergies   Allergen Reactions   • Lisinopril Hives     Other reaction(s): LISINOPRIL (ZESTRIL) (hives)       PHYSICAL EXAM:  Vital Signs: /61, temp 97.9, HR 60, RR 18, O2 95% on room air  Weight 129.5 pounds    General: NAD, sitting in chair  Eye Exam: anicteric sclera, no discharge  ENT: moist mucous membranes  Cardiovascular: regular rate, regular rhythm, no murmurs, rubs, or gallops  Pulmonary: CTA, unlabored, RA  Abdominal: soft, nontender, nondistended, bowel sounds audible  Neurological: Alert, oriented to self, generalized weakness/frailty  Skin: Small bruise to left knee, no redness or warmth  Psych: pleasant, confused, cooperative. PERTINENT LABS/IMAGING DATA:    11/7/2022 CBC, BMP: Hemoglobin 12.7, platelet 253, glucose 86, BUN 21, creatinine 0.66, sodium 140, potassium 4.6, EGFR 81  Magnesium 1.9, vitamin B12 858    CURRENT MEDICATIONS:  All medications reviewed and updated in Nursing Home EMR.     Provider: Marianne VALENCIA  Patient: Courtney Hoof   7/24/2023

## 2023-07-24 ENCOUNTER — HOME CARE VISIT (OUTPATIENT)
Dept: HOME HOSPICE | Facility: HOSPICE | Age: 88
End: 2023-07-24
Payer: MEDICARE

## 2023-07-24 ENCOUNTER — NURSING HOME VISIT (OUTPATIENT)
Dept: GERIATRICS | Facility: OTHER | Age: 88
End: 2023-07-24
Payer: COMMERCIAL

## 2023-07-24 DIAGNOSIS — K59.01 CONSTIPATION BY DELAYED COLONIC TRANSIT: ICD-10-CM

## 2023-07-24 DIAGNOSIS — K21.9 GASTROESOPHAGEAL REFLUX DISEASE WITHOUT ESOPHAGITIS: ICD-10-CM

## 2023-07-24 DIAGNOSIS — Z51.5 HOSPICE CARE: ICD-10-CM

## 2023-07-24 DIAGNOSIS — R13.12 OROPHARYNGEAL DYSPHAGIA: ICD-10-CM

## 2023-07-24 DIAGNOSIS — F41.9 ANXIETY: ICD-10-CM
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
## 2023-07-24 DIAGNOSIS — F03.90 DEMENTIA WITHOUT BEHAVIORAL DISTURBANCE (HCC): Primary | ICD-10-CM

## 2023-07-24 PROCEDURE — G0155 HHCP-SVS OF CSW,EA 15 MIN: HCPCS

## 2023-07-24 PROCEDURE — 99309 SBSQ NF CARE MODERATE MDM 30: CPT

## 2023-07-24 NOTE — ASSESSMENT & PLAN NOTE
Mood and behavior stable  Now on hospice care  Continue Citalopram, Lorazepam  Provide redirection, reorientation, distraction techniques  Fall Precautions  Assist with ADLs/IADLs  Encourage Hydration/ Nutrition  Implement sleep hygiene, limit night time interuptions   Encourage participation in group activities when appropriate

## 2023-07-24 NOTE — ASSESSMENT & PLAN NOTE
Unwitnessed on 7/22  Small bruise to left knee  Continue neuro checks and vital signs  Maintain fall precautions - continue bed alarm, low bed, fall mats

## 2023-07-25 ENCOUNTER — HOME CARE VISIT (OUTPATIENT)
Dept: HOME HEALTH SERVICES | Facility: HOME HEALTHCARE | Age: 88
End: 2023-07-25
Payer: MEDICARE

## 2023-07-25 PROCEDURE — G0156 HHCP-SVS OF AIDE,EA 15 MIN: HCPCS

## 2023-07-26 ENCOUNTER — HOME CARE VISIT (OUTPATIENT)
Dept: HOME HEALTH SERVICES | Facility: HOME HEALTHCARE | Age: 88
End: 2023-07-26
Payer: MEDICARE

## 2023-07-26 PROCEDURE — G0156 HHCP-SVS OF AIDE,EA 15 MIN: HCPCS

## 2023-07-28 ENCOUNTER — HOME CARE VISIT (OUTPATIENT)
Dept: HOME HEALTH SERVICES | Facility: HOME HEALTHCARE | Age: 88
End: 2023-07-28
Payer: MEDICARE

## 2023-07-28 PROCEDURE — G0156 HHCP-SVS OF AIDE,EA 15 MIN: HCPCS

## 2023-07-28 PROCEDURE — G0299 HHS/HOSPICE OF RN EA 15 MIN: HCPCS

## 2023-08-01 ENCOUNTER — HOME CARE VISIT (OUTPATIENT)
Dept: HOME HEALTH SERVICES | Facility: HOME HEALTHCARE | Age: 88
End: 2023-08-01
Payer: MEDICARE

## 2023-08-01 PROCEDURE — G0156 HHCP-SVS OF AIDE,EA 15 MIN: HCPCS

## 2023-08-02 ENCOUNTER — HOME CARE VISIT (OUTPATIENT)
Dept: HOME HOSPICE | Facility: HOSPICE | Age: 88
End: 2023-08-02
Payer: MEDICARE

## 2023-08-02 DIAGNOSIS — F41.9 ANXIETY: ICD-10-CM

## 2023-08-02 RX ORDER — LORAZEPAM 0.5 MG/1
0.25 TABLET ORAL 2 TIMES DAILY
Qty: 30 TABLET | Refills: 0 | Status: SHIPPED | OUTPATIENT
Start: 2023-08-02 | End: 2023-08-14 | Stop reason: SDUPTHER

## 2023-08-04 ENCOUNTER — HOME CARE VISIT (OUTPATIENT)
Dept: HOME HEALTH SERVICES | Facility: HOME HEALTHCARE | Age: 88
End: 2023-08-04
Payer: MEDICARE

## 2023-08-04 PROCEDURE — G0156 HHCP-SVS OF AIDE,EA 15 MIN: HCPCS

## 2023-08-04 PROCEDURE — G0299 HHS/HOSPICE OF RN EA 15 MIN: HCPCS

## 2023-08-07 ENCOUNTER — HOME CARE VISIT (OUTPATIENT)
Dept: HOME HOSPICE | Facility: HOSPICE | Age: 88
End: 2023-08-07
Payer: MEDICARE

## 2023-08-07 ENCOUNTER — HOME CARE VISIT (OUTPATIENT)
Dept: HOME HEALTH SERVICES | Facility: HOME HEALTHCARE | Age: 88
End: 2023-08-07
Payer: MEDICARE

## 2023-08-07 VITALS
TEMPERATURE: 97.8 F | RESPIRATION RATE: 16 BRPM | OXYGEN SATURATION: 97 % | SYSTOLIC BLOOD PRESSURE: 136 MMHG | HEART RATE: 68 BPM | DIASTOLIC BLOOD PRESSURE: 80 MMHG | WEIGHT: 130 LBS

## 2023-08-07 PROCEDURE — G0156 HHCP-SVS OF AIDE,EA 15 MIN: HCPCS

## 2023-08-07 PROCEDURE — G0155 HHCP-SVS OF CSW,EA 15 MIN: HCPCS

## 2023-08-09 ENCOUNTER — HOME CARE VISIT (OUTPATIENT)
Dept: HOME HEALTH SERVICES | Facility: HOME HEALTHCARE | Age: 88
End: 2023-08-09
Payer: MEDICARE

## 2023-08-09 PROCEDURE — G0156 HHCP-SVS OF AIDE,EA 15 MIN: HCPCS

## 2023-08-10 ENCOUNTER — HOME CARE VISIT (OUTPATIENT)
Dept: HOME HEALTH SERVICES | Facility: HOME HEALTHCARE | Age: 88
End: 2023-08-10
Payer: MEDICARE

## 2023-08-10 PROCEDURE — G0299 HHS/HOSPICE OF RN EA 15 MIN: HCPCS

## 2023-08-11 ENCOUNTER — HOME CARE VISIT (OUTPATIENT)
Dept: HOME HEALTH SERVICES | Facility: HOME HEALTHCARE | Age: 88
End: 2023-08-11
Payer: MEDICARE

## 2023-08-11 PROCEDURE — G0156 HHCP-SVS OF AIDE,EA 15 MIN: HCPCS

## 2023-08-13 ENCOUNTER — HOME CARE VISIT (OUTPATIENT)
Dept: HOME HEALTH SERVICES | Facility: HOME HEALTHCARE | Age: 88
End: 2023-08-13
Payer: MEDICARE

## 2023-08-13 VITALS — HEART RATE: 98 BPM

## 2023-08-13 PROCEDURE — G0299 HHS/HOSPICE OF RN EA 15 MIN: HCPCS

## 2023-08-14 ENCOUNTER — HOME CARE VISIT (OUTPATIENT)
Dept: HOME HEALTH SERVICES | Facility: HOME HEALTHCARE | Age: 88
End: 2023-08-14
Payer: MEDICARE

## 2023-08-14 DIAGNOSIS — F41.9 ANXIETY: ICD-10-CM

## 2023-08-14 PROCEDURE — G0156 HHCP-SVS OF AIDE,EA 15 MIN: HCPCS

## 2023-08-14 RX ORDER — LORAZEPAM 0.5 MG/1
0.25 TABLET ORAL 2 TIMES DAILY
Qty: 30 TABLET | Refills: 0 | Status: SHIPPED | OUTPATIENT
Start: 2023-08-14

## 2023-08-14 RX ORDER — LORAZEPAM 0.5 MG/1
0.25 TABLET ORAL 2 TIMES DAILY
Qty: 30 TABLET | Refills: 0 | Status: CANCELLED | OUTPATIENT
Start: 2023-08-14

## 2023-08-16 ENCOUNTER — HOME CARE VISIT (OUTPATIENT)
Dept: HOME HEALTH SERVICES | Facility: HOME HEALTHCARE | Age: 88
End: 2023-08-16
Payer: MEDICARE

## 2023-08-16 PROCEDURE — G0156 HHCP-SVS OF AIDE,EA 15 MIN: HCPCS

## 2023-08-17 ENCOUNTER — HOME CARE VISIT (OUTPATIENT)
Dept: HOME HEALTH SERVICES | Facility: HOME HEALTHCARE | Age: 88
End: 2023-08-17
Payer: MEDICARE

## 2023-08-17 PROCEDURE — G0156 HHCP-SVS OF AIDE,EA 15 MIN: HCPCS

## 2023-08-21 ENCOUNTER — HOME CARE VISIT (OUTPATIENT)
Dept: HOME HEALTH SERVICES | Facility: HOME HEALTHCARE | Age: 88
End: 2023-08-21
Payer: MEDICARE

## 2023-08-21 PROCEDURE — G0156 HHCP-SVS OF AIDE,EA 15 MIN: HCPCS

## 2023-08-22 ENCOUNTER — HOME CARE VISIT (OUTPATIENT)
Dept: HOME HEALTH SERVICES | Facility: HOME HEALTHCARE | Age: 88
End: 2023-08-22
Payer: MEDICARE

## 2023-08-22 ENCOUNTER — NURSING HOME VISIT (OUTPATIENT)
Dept: GERIATRICS | Facility: OTHER | Age: 88
End: 2023-08-22
Payer: COMMERCIAL

## 2023-08-22 DIAGNOSIS — F03.90 DEMENTIA WITHOUT BEHAVIORAL DISTURBANCE (HCC): ICD-10-CM

## 2023-08-22 DIAGNOSIS — I63.9 CEREBELLAR INFARCTION (HCC): ICD-10-CM

## 2023-08-22 DIAGNOSIS — I10 ESSENTIAL HYPERTENSION: ICD-10-CM

## 2023-08-22 DIAGNOSIS — F41.9 ANXIETY: ICD-10-CM

## 2023-08-22 DIAGNOSIS — K21.9 GASTROESOPHAGEAL REFLUX DISEASE WITHOUT ESOPHAGITIS: Primary | ICD-10-CM

## 2023-08-22 PROCEDURE — 99309 SBSQ NF CARE MODERATE MDM 30: CPT | Performed by: INTERNAL MEDICINE

## 2023-08-22 PROCEDURE — G0156 HHCP-SVS OF AIDE,EA 15 MIN: HCPCS

## 2023-08-22 NOTE — PROGRESS NOTES
[address]  [phone]) 0723 [facility]  POS 32      NAME: Timothy Hernandez  AGE: 80 y.o. SEX: female [phone]    DATE OF ENCOUNTER: 8/22/2023    Assessment and Plan     1. Gastroesophageal reflux disease without esophagitis        2. Essential hypertension        3. Dementia without behavioral disturbance ([address])        4. Cerebellar infarction ([address])        5. Anxiety           Gastroesophageal reflux disease without esophagitis  Stable  Cont famotidine 20 mg 1 tab po daily    Essential hypertension  7/25/2023 22:12 136 / 66 mmHg   Bp stable  Pt off meds  Cont care per hospice    Dementia without behavioral disturbance ([address])  Monitor sleep/wake cycle  Assist with adls/iadls  Encourage group participation  Meal intake: labile but mostly 51-75%  BM: every other day  Weight: stable; was 129; now is 130  Cont care per hospice    Cerebellar infarction ([address])  Stable  Hospice managing meds; pt off asa/statin  Cont care per hospice    Anxiety  Stable  Cont citalopram 20 mg 1 tab po daily  Cont ativan  Cont care per hospice       Code status: DNR/DNI/hospice    Chief Complaint     Long term follow-up visit. 60 day orders signed    History of Present Illness   Pt seen and examined. Pt on hospice. Pt pleasant & oriented to self. Pt sitting in wheelchair in lunchroom.     The following portions of the patient's history were reviewed and updated as appropriate: allergies, current medications, past family history, past medical history, past social history, past surgical history and problem list.    Review of Systems     Review of Systems   Unable to perform ROS: Dementia       Active Problem List     Patient Active Problem List   Diagnosis   • Anemia   • Cerebellar infarction ([address])   • CKD (chronic kidney disease) stage 3, GFR 30-59 ml/min ([facility])   • 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   • Dementia without behavioral disturbance ([facility])   • Diastolic dysfunction   • DJD (degenerative joint disease)   • Dysphagia   • Essential hypertension   • Fall   • Glucose intolerance (impaired glucose tolerance)   • HH (hiatus hernia)   • Hyperlipidemia   • Left sided lacunar infarction (HCC)   • Vitamin B 12 deficiency   • Ambulatory dysfunction   • Axillary lump, right   • Delusional disorder (HCC)   • Anxiety   • Gastroesophageal reflux disease without esophagitis   • Generalized abdominal pain   • History of CVA (cerebrovascular accident)   • Acute kidney injury superimposed on chronic kidney disease (HCC)   • Metabolic encephalopathy   • Overweight   • Reactive depression   • Seborrheic keratoses   • Syncope   • Acute pain of both knees   • Hospice care       Objective     Vitals: Reviewed in [employer] system. VSS    General: Awake, alert, dementia, only oriented to self  Head: atraumatic, normocephalic  Cardiovascular: RRR  Lungs: Clear to auscultation bilaterally  Abdomen: nontender/nondistended, +BS  Legs: no cyanosis, clubbing  Skin: clean, dry, intact  Psych: calm, cooperative    Pertinent Laboratory/Diagnostic Studies:  Refer to facility chart. Current Medications   Medications reviewed and updated in facility chart.     Acetaminophen Tablet 325 MG    Acetaminophen Suppository 650 MG Insert 1 suppository rectally every 4 hours as needed for Mild Pain Max 3 GM APAP / 24 HR, Give Suppository If Unable to Take By Mouth AND Insert 1 suppository rectally every 4 hours as needed for Temperature = or > 100 degrees Oral / 101 degrees Rectal Max 3 GM APAP / 24 HR, Give Suppository If Unable to Take By Mouth   Milk of Magnesia Suspension 2400 MG/30ML Give 30 ml by mouth as needed for Constipation 1 Time a Day, If No BM on by Day # 2, Evening Nurse Gives a Laxative on Day # 2 AND Give 30 ml by mouth as needed for Constipation 1 Time a Day, If No BM on by Day # 3, Evening Nurse Gives a Laxative on Day # 3   Bisac-Evac Suppository 10 MG (Bisacodyl) Insert 1 suppository rectally as needed for Constipation 1 Time a Day, If NO BM by AM Day # 4, Day Nurse Gives a Suppository that AM   Enema Disposable Enema (Sodium Phosphates) Insert 1 application rectally as needed for Constipation 1 Time a Day, If NO BM within 3-5 Hours of Suppository Insertion Give Enema   ARTIFICIAL TEARS 1.4% SOLN INSTILL 2 DROPS INTO EACH EYE EVERY 8 HOURS AS NEEDED(Related Diagnoses: 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))(Indications for Use: /)   FAMOTIDINE 20MG TABLET GIVE 1 TABLET BY MOUTH ONCE DAILY FOR GASTROESOPHAGEAL REFLUX DISEASE(Related Diagnoses: GASTRO-ESOPHAGEAL REFLUX DISEASE WITHOUT ESOPHAGITIS (K21.9))(Indications for Use: GERD)   ACETAMINOPHEN 325MG TABLET GIVE 2 TABLETS (650MG) BY MOUTH THREE TIMES DAILY FOR LEFT KNEE PAIN(Related Diagnoses: PAIN, UNSPECIFIED (R52))(Indications for Use: /)   SENNA 8.6MG TABLET GIVE 2 TABLETS (17.2MG) BY MOUTH TWICE DAILY IN THE MORNING AND AT BEDTIME FOR CONSTIPATION(Indications for Use: /)   LORAZEPAM ORAL *CONC* 2MG/ML GIVE 0.25ML (0.5MG) BY MOUTH EVERY 6 HOURS AS NEEDED FOR ANXIETY, AGITATION, SHORTNESS OF BREATH *DISCARD OPENED BOTTLE AFTER 90 DAYS*(Related Diagnoses: Anxiety disorder, unspecified (F41.9))   OXYCODONE 5MG TABLET GIVE 1/2 TABLET (2.5MG) BY MOUTH EVERY 4 HOURS AS NEEDED FOR MODERATE PAIN (MAX DAILY DOSE: 15MG)(Related Diagnoses: PAIN, UNSPECIFIED (R52))(Indications for Use: mod pain)   OXYCODONE 10MG TAB GIVE 1/2 TABLET (5MG) BY MOUTH EVERY 4 HOURS AS NEEDED FOR SEVERE PAIN (MAX DAILY DOSE: 30MG)(Related Diagnoses: Pain in knee (M25.56))   CITALOPRAM 20MG TABLET GIVE 1 TABLET BY MOUTH ONCE DAILY FOR MAJOR DEPRESSIVE DISORDER(Related Diagnoses: MAJOR DEPRESSIVE DISORDER, RECURRENT, 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

## 2023-08-23 ENCOUNTER — HOME CARE VISIT (OUTPATIENT)
Dept: HOME HOSPICE | Facility: HOSPICE | Age: 88
End: 2023-08-23
Payer: MEDICARE

## 2023-08-25 ENCOUNTER — HOME CARE VISIT (OUTPATIENT)
Dept: HOME HEALTH SERVICES | Facility: HOME HEALTHCARE | Age: 88
End: 2023-08-25
Payer: MEDICARE

## 2023-08-25 VITALS
RESPIRATION RATE: 16 BRPM | SYSTOLIC BLOOD PRESSURE: 122 MMHG | TEMPERATURE: 97.3 F | DIASTOLIC BLOOD PRESSURE: 78 MMHG | HEART RATE: 92 BPM | OXYGEN SATURATION: 91 %

## 2023-08-25 PROCEDURE — G0299 HHS/HOSPICE OF RN EA 15 MIN: HCPCS

## 2023-08-26 ENCOUNTER — HOME CARE VISIT (OUTPATIENT)
Dept: HOME HEALTH SERVICES | Facility: HOME HEALTHCARE | Age: 88
End: 2023-08-26
Payer: MEDICARE

## 2023-08-26 PROCEDURE — G0156 HHCP-SVS OF AIDE,EA 15 MIN: HCPCS

## 2023-08-27 ENCOUNTER — HOME CARE VISIT (OUTPATIENT)
Dept: HOME HEALTH SERVICES | Facility: HOME HEALTHCARE | Age: 88
End: 2023-08-27
Payer: MEDICARE

## 2023-08-27 PROCEDURE — G0156 HHCP-SVS OF AIDE,EA 15 MIN: HCPCS

## 2023-08-28 ENCOUNTER — HOME CARE VISIT (OUTPATIENT)
Dept: HOME HOSPICE | Facility: HOSPICE | Age: 88
End: 2023-08-28
Payer: MEDICARE

## 2023-08-28 ENCOUNTER — HOME CARE VISIT (OUTPATIENT)
Dept: HOME HEALTH SERVICES | Facility: HOME HEALTHCARE | Age: 88
End: 2023-08-28
Payer: MEDICARE

## 2023-08-28 PROCEDURE — G0156 HHCP-SVS OF AIDE,EA 15 MIN: HCPCS

## 2023-08-28 PROCEDURE — G0155 HHCP-SVS OF CSW,EA 15 MIN: HCPCS

## 2023-08-28 NOTE — ASSESSMENT & PLAN NOTE
Monitor sleep/wake cycle  Assist with adls/iadls  Encourage group participation  Meal intake: labile but mostly 51-75%  BM: every other day  Weight: stable; was 129; now is 130  Cont care per hospice

## 2023-08-31 ENCOUNTER — HOME CARE VISIT (OUTPATIENT)
Dept: HOME HEALTH SERVICES | Facility: HOME HEALTHCARE | Age: 88
End: 2023-08-31
Payer: MEDICARE

## 2023-08-31 ENCOUNTER — HOME CARE VISIT (OUTPATIENT)
Dept: HOME HOSPICE | Facility: HOSPICE | Age: 88
End: 2023-08-31
Payer: MEDICARE

## 2023-08-31 VITALS
HEART RATE: 65 BPM | TEMPERATURE: 97.4 F | OXYGEN SATURATION: 97 % | SYSTOLIC BLOOD PRESSURE: 120 MMHG | DIASTOLIC BLOOD PRESSURE: 62 MMHG | RESPIRATION RATE: 16 BRPM

## 2023-08-31 PROCEDURE — G0299 HHS/HOSPICE OF RN EA 15 MIN: HCPCS

## 2023-08-31 PROCEDURE — G0156 HHCP-SVS OF AIDE,EA 15 MIN: HCPCS

## 2023-09-04 ENCOUNTER — HOME CARE VISIT (OUTPATIENT)
Dept: HOME HOSPICE | Facility: HOSPICE | Age: 88
End: 2023-09-04
Payer: MEDICARE

## 2023-09-05 ENCOUNTER — HOME CARE VISIT (OUTPATIENT)
Dept: HOME HEALTH SERVICES | Facility: HOME HEALTHCARE | Age: 88
End: 2023-09-05
Payer: MEDICARE

## 2023-09-05 PROCEDURE — G0156 HHCP-SVS OF AIDE,EA 15 MIN: HCPCS

## 2023-09-07 ENCOUNTER — HOME CARE VISIT (OUTPATIENT)
Dept: HOME HEALTH SERVICES | Facility: HOME HEALTHCARE | Age: 88
End: 2023-09-07
Payer: MEDICARE

## 2023-09-07 PROCEDURE — G0156 HHCP-SVS OF AIDE,EA 15 MIN: HCPCS

## 2023-09-07 PROCEDURE — G0299 HHS/HOSPICE OF RN EA 15 MIN: HCPCS

## 2023-09-11 ENCOUNTER — HOME CARE VISIT (OUTPATIENT)
Dept: HOME HOSPICE | Facility: HOSPICE | Age: 88
End: 2023-09-11
Payer: MEDICARE

## 2023-09-11 PROCEDURE — G0155 HHCP-SVS OF CSW,EA 15 MIN: HCPCS

## 2023-09-13 ENCOUNTER — HOME CARE VISIT (OUTPATIENT)
Dept: HOME HEALTH SERVICES | Facility: HOME HEALTHCARE | Age: 88
End: 2023-09-13
Payer: MEDICARE

## 2023-09-13 PROCEDURE — G0156 HHCP-SVS OF AIDE,EA 15 MIN: HCPCS

## 2023-09-14 ENCOUNTER — HOME CARE VISIT (OUTPATIENT)
Dept: HOME HOSPICE | Facility: HOSPICE | Age: 88
End: 2023-09-14
Payer: MEDICARE

## 2023-09-15 ENCOUNTER — HOME CARE VISIT (OUTPATIENT)
Dept: HOME HEALTH SERVICES | Facility: HOME HEALTHCARE | Age: 88
End: 2023-09-15
Payer: MEDICARE

## 2023-09-15 VITALS
RESPIRATION RATE: 20 BRPM | OXYGEN SATURATION: 95 % | DIASTOLIC BLOOD PRESSURE: 76 MMHG | SYSTOLIC BLOOD PRESSURE: 140 MMHG | TEMPERATURE: 97.6 F | HEART RATE: 67 BPM

## 2023-09-15 PROCEDURE — G0299 HHS/HOSPICE OF RN EA 15 MIN: HCPCS

## 2023-09-15 PROCEDURE — G0156 HHCP-SVS OF AIDE,EA 15 MIN: HCPCS

## 2023-09-16 ENCOUNTER — HOME CARE VISIT (OUTPATIENT)
Dept: HOME HEALTH SERVICES | Facility: HOME HEALTHCARE | Age: 88
End: 2023-09-16
Payer: MEDICARE

## 2023-09-16 PROCEDURE — G0156 HHCP-SVS OF AIDE,EA 15 MIN: HCPCS

## 2023-09-18 ENCOUNTER — HOME CARE VISIT (OUTPATIENT)
Dept: HOME HEALTH SERVICES | Facility: HOME HEALTHCARE | Age: 88
End: 2023-09-18
Payer: MEDICARE

## 2023-09-18 PROCEDURE — G0156 HHCP-SVS OF AIDE,EA 15 MIN: HCPCS

## 2023-09-20 ENCOUNTER — HOME CARE VISIT (OUTPATIENT)
Dept: HOME HEALTH SERVICES | Facility: HOME HEALTHCARE | Age: 88
End: 2023-09-20
Payer: MEDICARE

## 2023-09-20 PROCEDURE — G0156 HHCP-SVS OF AIDE,EA 15 MIN: HCPCS

## 2023-09-21 ENCOUNTER — HOME CARE VISIT (OUTPATIENT)
Dept: HOME HEALTH SERVICES | Facility: HOME HEALTHCARE | Age: 88
End: 2023-09-21
Payer: MEDICARE

## 2023-09-21 PROCEDURE — G0156 HHCP-SVS OF AIDE,EA 15 MIN: HCPCS

## 2023-09-22 ENCOUNTER — HOME CARE VISIT (OUTPATIENT)
Dept: HOME HOSPICE | Facility: HOSPICE | Age: 88
End: 2023-09-22
Payer: MEDICARE

## 2023-09-22 ENCOUNTER — HOME CARE VISIT (OUTPATIENT)
Dept: HOME HEALTH SERVICES | Facility: HOME HEALTHCARE | Age: 88
End: 2023-09-22
Payer: MEDICARE

## 2023-09-22 VITALS
HEART RATE: 83 BPM | TEMPERATURE: 97.3 F | SYSTOLIC BLOOD PRESSURE: 138 MMHG | OXYGEN SATURATION: 94 % | DIASTOLIC BLOOD PRESSURE: 78 MMHG | RESPIRATION RATE: 20 BRPM

## 2023-09-22 PROCEDURE — G0155 HHCP-SVS OF CSW,EA 15 MIN: HCPCS

## 2023-09-22 PROCEDURE — G0299 HHS/HOSPICE OF RN EA 15 MIN: HCPCS

## 2023-09-24 ENCOUNTER — HOME CARE VISIT (OUTPATIENT)
Dept: HOME HEALTH SERVICES | Facility: HOME HEALTHCARE | Age: 88
End: 2023-09-24
Payer: MEDICARE

## 2023-09-24 VITALS — DIASTOLIC BLOOD PRESSURE: 72 MMHG | SYSTOLIC BLOOD PRESSURE: 130 MMHG | HEART RATE: 76 BPM | RESPIRATION RATE: 18 BRPM

## 2023-09-24 PROCEDURE — G0299 HHS/HOSPICE OF RN EA 15 MIN: HCPCS

## 2023-09-25 ENCOUNTER — HOME CARE VISIT (OUTPATIENT)
Dept: HOME HEALTH SERVICES | Facility: HOME HEALTHCARE | Age: 88
End: 2023-09-25
Payer: MEDICARE

## 2023-09-25 PROCEDURE — G0156 HHCP-SVS OF AIDE,EA 15 MIN: HCPCS

## 2023-09-26 ENCOUNTER — HOME CARE VISIT (OUTPATIENT)
Dept: HOME HOSPICE | Facility: HOSPICE | Age: 88
End: 2023-09-26
Payer: MEDICARE

## 2023-09-27 ENCOUNTER — HOME CARE VISIT (OUTPATIENT)
Dept: HOME HEALTH SERVICES | Facility: HOME HEALTHCARE | Age: 88
End: 2023-09-27
Payer: MEDICARE

## 2023-09-27 VITALS — HEART RATE: 68 BPM | RESPIRATION RATE: 18 BRPM

## 2023-09-27 PROCEDURE — G0299 HHS/HOSPICE OF RN EA 15 MIN: HCPCS

## 2023-09-27 PROCEDURE — G0156 HHCP-SVS OF AIDE,EA 15 MIN: HCPCS

## 2023-09-29 NOTE — PROGRESS NOTES
[facility]  [address] [place] [address], [address] [place] [address] [address]  [phone]    NAME: Tunde Armstrong  AGE: 80 y.o. SEX: female    Progress Note    Location: [facility]  POS: 28 (LT)  Code Status: DNR/DNI/Hospice    Chief complaint / Reason for visit:  Follow-up visit    Assessment/Plan:    Hospice care  · Followed by hospice  · [facility]  · Continue comfort measures  · Resident does not appear to be in any pain  · No anxiety or restlessness noted on exam    Dementia without behavioral disturbance (HCC)  · Currently alert and oriented x 1  · Seen out of bed in wheelchair in activities room  · Resident appears comfortable  · [place]  · Followed by [address]  · Continue 24/7 supportive care    Essential hypertension  · Blood pressures stable  · Resident will remain off medications  · Continue care per Hospice recommendations    Dysphagia  · Continue to monitor  · Assist resident with meals  · Aspiration precautions      This is a 80 y.o. female seen today at [facility]. Medical history includes, not limited to, Alzheimer's Disease, hypertension, hyperlipidemia, GERD, dysphagia, atherosclerotic heart disease, and CKD stage 3. Resident was seen today for a follow up visit. She is seen out of bed in her wheelchair in the activities room. Resident is noted to be hard of hearing. She has dementia. Physical exam able to be completed, all other information gathered from coordinating care with nursing staff. Reports from nursing staff of no issues at this time. Nursing and prior provider notes reviewed on this visit. Discussed visit with PCP and nursing staff/ supervisor.       Review of Systems   Unable to perform ROS: Dementia          ALLERGY: Reviewed, unchanged  Allergies   Allergen Reactions   • Lisinopril Hives     Other reaction(s): LISINOPRIL (ZESTRIL) (hives)       HISTORY:  Medical Hx: Reviewed, unchanged  Family Hx: Reviewed, unchanged  Soc Hx: Reviewed, unchanged      Physical Exam  Vitals reviewed. Constitutional:       General: She is not in acute distress. Appearance: Normal appearance. She is not ill-appearing. HENT:      Head: Normocephalic. Right Ear: Tympanic membrane normal.      Left Ear: Tympanic membrane normal.      Ears:      Comments: Hard of hearing     Nose: Nose normal. No congestion. Mouth/Throat:      Mouth: Mucous membranes are moist.      Pharynx: Oropharynx is clear. Eyes:      General:         Right eye: No discharge. Left eye: No discharge. Extraocular Movements: Extraocular movements intact. Conjunctiva/sclera: Conjunctivae normal.      Pupils: Pupils are equal, round, and reactive to light. Comments: eyeglasses   Cardiovascular:      Rate and Rhythm: Normal rate and regular rhythm. Pulses: Normal pulses. Heart sounds: Normal heart sounds. Pulmonary:      Effort: Pulmonary effort is normal. No respiratory distress. Breath sounds: Normal breath sounds. Chest:      Chest wall: No tenderness. Abdominal:      General: Bowel sounds are normal.      Palpations: Abdomen is soft. Tenderness: There is no abdominal tenderness. Musculoskeletal:         General: No swelling. Normal range of motion. Cervical back: Normal range of motion. Right lower leg: No edema. Left lower leg: No edema. Skin:     General: Skin is warm and dry. Neurological:      Mental Status: She is alert. Mental status is at baseline. She is disoriented. Motor: No weakness. Comments: Alert and oriented x 1   Psychiatric:         Mood and Affect: Mood normal.            Laboratory results / Imaging reviewed: Hard copy/ies in medical chart:    There were no vitals filed for this visit. Current Medications: All medications reviewed and updated in Nursing Home Chart    Please note:  This note was completed in part utilizing a voice-recognition software may have been used in the preparation of this document. Grammatical errors, random word insertion, spelling mistakes, and incomplete sentences may be an occasional consequence of the system secondary to software limitations, ambient noise and hardware issues. Occasional wrong word or "sound-alike" substitutions may have occurred due to the inherent limitations of voice recognition software. At the time of dictation, efforts were made to edit, clarify and/or correct errors. Interpretation should be guided by context. Please read the chart carefully and recognize, using context, where substitutions have occurred. If you have any questions or concerns about the context, text or information contained within the body of this dictation, please contact myself, the provider, for further clarification.       ERIK Richards  10/3/2023

## 2023-10-01 NOTE — PROGRESS NOTES
10/1/2023 12:45 PM  [facility] facility patient requests medication adjusted due to change in patient condition. Filled electronically via Epic as per [place] State Law. Requested Prescriptions     Signed Prescriptions Disp Refills   • LORazepam (ATIVAN) 2 mg/mL concentrated solution 30 mL 0     Sig: Take 0.25 mL (0.5 mg total) by mouth 2 (two) times a day. May also take 0.25 mL (0.5 mg total) every 2 (two) hours as needed for anxiety (dyspnea / insomnia). Administer twice daily at 0600 and 1700. • Morphine Sulfate, Concentrate, 20 mg/mL concentrated solution 30 mL 0     Sig: Take 0.25 mL (5 mg total) by mouth every 2 (two) hours as needed (pain / SOB) Max Daily Amount: 60 mg       [facility] Answering Service: [phone]  You can find me on [employer]!

## 2023-10-02 NOTE — PROGRESS NOTES
[address]  [phone]) 1402 [facility]  POS 32      NAME: Sanjuana Monique  AGE: 80 y.o. SEX: female [phone]    DATE OF ENCOUNTER: 10-2-2023    Assessment and Plan     1. Hospice care        2. Dementia without behavioral disturbance ([address])           Hospice care  Pt appears to be in process of dying  Pt with terma  Cont hospice meds  Cont care per hospice nurse    Dementia without behavioral disturbance ([address])  Pt with sudden decline over the weekend  Pt normally awake alert & in lunchroom with other tenants  Pt appears to be in process of dying  Pt in bed with [facility] at bedside  Assist with adls/iadls  Death imminent       Code status: DNR/DNI    Chief Complaint     Acute follow-up visit. History of Present Illness     Asked by nurse to see pt as pt declining. Pt appears to be in process of dying. Hospice social work at bedside.     The following portions of the patient's history were reviewed and updated as appropriate: allergies, current medications, past family history, past medical history, past social history, past surgical history and problem list.    Review of Systems     Review of Systems   Unable to perform ROS: Mental status change       Active Problem List     Patient Active Problem List   Diagnosis    Anemia    Cerebellar infarction ([address])    CKD (chronic kidney disease) stage 3, GFR 30-59 ml/min (HCC)    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    Dementia without behavioral disturbance (HCC)    Diastolic dysfunction    DJD (degenerative joint disease)    Dysphagia    Essential hypertension    Fall    Glucose intolerance (impaired glucose tolerance)    HH (hiatus hernia)    Hyperlipidemia    Left sided lacunar infarction ([address])    Vitamin B 12 deficiency    Ambulatory dysfunction    Axillary lump, right    Delusional disorder (HCC)    Anxiety    Gastroesophageal reflux disease without esophagitis    Generalized abdominal pain    History of CVA (cerebrovascular accident)    Acute kidney injury superimposed on chronic kidney disease     Metabolic encephalopathy    Overweight    Reactive depression    Seborrheic keratoses    Syncope    Acute pain of both knees    Hospice care       Objective     Vitals: Reviewed in [employer] system. VSS    General: pt does not arouse to verbal stimuli  Head: atraumatic, normocephalic  Cardiovascular: RRR  Lungs: Clear to auscultation bilaterally but agonal breathing  Abdomen: nontender/nondistended, +BS  Legs: no cyanosis, clubbing or edema  Skin: clean, dry  Psych: calm, cooperative    Pertinent Laboratory/Diagnostic Studies:  Refer to facility chart. Current Medications   Medications reviewed and updated in facility chart.  Total time spent: 10 minutes    Stephon Hopkins MD  Internal Medicine  Senior Care Physician

## 2023-10-03 ENCOUNTER — HOME CARE VISIT (OUTPATIENT)
Dept: HOME HOSPICE | Facility: HOSPICE | Age: 88
End: 2023-10-03
Payer: MEDICARE

## 2023-10-03 NOTE — ASSESSMENT & PLAN NOTE
· Currently alert and oriented x 1  · Seen out of bed in wheelchair in activities room  · Resident appears comfortable  · [place]  · Followed by [address]  · Continue 24/7 supportive care

## 2023-10-03 NOTE — ASSESSMENT & PLAN NOTE
· Blood pressures stable  · Resident will remain off medications  · Continue care per Hospice recommendations

## 2023-10-03 NOTE — ASSESSMENT & PLAN NOTE
· Followed by hospice  · [facility]  · Continue comfort measures  · Resident does not appear to be in any pain  · No anxiety or restlessness noted on exam

## 2023-10-05 ENCOUNTER — HOME CARE VISIT (OUTPATIENT)
Dept: HOME HOSPICE | Facility: HOSPICE | Age: 88
End: 2023-10-05
Payer: MEDICARE

## 2023-10-19 NOTE — ASSESSMENT & PLAN NOTE
Pt with sudden decline over the weekend  Pt normally awake alert & in lunchroom with other tenants  Pt appears to be in process of dying  Pt in bed with [facility] the [facility] at bedside  Assist with adls/iadls  Death imminent